# Patient Record
Sex: FEMALE | Race: WHITE | Employment: OTHER | URBAN - NONMETROPOLITAN AREA
[De-identification: names, ages, dates, MRNs, and addresses within clinical notes are randomized per-mention and may not be internally consistent; named-entity substitution may affect disease eponyms.]

---

## 2017-04-04 ENCOUNTER — TELEPHONE (OUTPATIENT)
Dept: OBGYN | Age: 62
End: 2017-04-04

## 2017-04-04 DIAGNOSIS — Z12.31 ENCOUNTER FOR SCREENING MAMMOGRAM FOR BREAST CANCER: Primary | ICD-10-CM

## 2017-04-10 ENCOUNTER — HOSPITAL ENCOUNTER (OUTPATIENT)
Dept: WOMENS IMAGING | Age: 62
Discharge: HOME | End: 2017-04-10
Payer: OTHER GOVERNMENT

## 2017-04-10 DIAGNOSIS — Z12.31 ENCOUNTER FOR SCREENING MAMMOGRAM FOR BREAST CANCER: ICD-10-CM

## 2017-04-10 PROCEDURE — 77063 BREAST TOMOSYNTHESIS BI: CPT

## 2017-04-11 ENCOUNTER — TELEPHONE (OUTPATIENT)
Dept: WOMENS IMAGING | Age: 62
End: 2017-04-11

## 2017-04-13 ENCOUNTER — HOSPITAL ENCOUNTER (OUTPATIENT)
Dept: WOMENS IMAGING | Age: 62
Discharge: HOME | End: 2017-04-13
Payer: OTHER GOVERNMENT

## 2017-04-13 DIAGNOSIS — N64.89 BREAST ASYMMETRY: ICD-10-CM

## 2017-04-13 PROCEDURE — G0279 TOMOSYNTHESIS, MAMMO: HCPCS

## 2017-05-17 ENCOUNTER — OFFICE VISIT (OUTPATIENT)
Dept: OBGYN | Age: 62
End: 2017-05-17
Payer: OTHER GOVERNMENT

## 2017-05-17 VITALS
SYSTOLIC BLOOD PRESSURE: 140 MMHG | WEIGHT: 188 LBS | HEIGHT: 70 IN | DIASTOLIC BLOOD PRESSURE: 81 MMHG | HEART RATE: 60 BPM | BODY MASS INDEX: 26.92 KG/M2

## 2017-05-17 DIAGNOSIS — Z01.419 WELL WOMAN EXAM WITH ROUTINE GYNECOLOGICAL EXAM: Primary | ICD-10-CM

## 2017-05-17 DIAGNOSIS — N95.9 MENOPAUSAL AND POSTMENOPAUSAL DISORDER: ICD-10-CM

## 2017-05-17 DIAGNOSIS — Z12.72 SCREENING FOR VAGINAL CANCER: ICD-10-CM

## 2017-05-17 DIAGNOSIS — Z85.42 HISTORY OF ENDOMETRIAL CANCER: ICD-10-CM

## 2017-05-17 DIAGNOSIS — Z13.820 SCREENING FOR OSTEOPOROSIS: ICD-10-CM

## 2017-05-17 PROCEDURE — 99396 PREV VISIT EST AGE 40-64: CPT | Performed by: NURSE PRACTITIONER

## 2017-05-17 ASSESSMENT — ENCOUNTER SYMPTOMS
GASTROINTESTINAL NEGATIVE: 1
RESPIRATORY NEGATIVE: 1
EYES NEGATIVE: 1

## 2017-05-25 ENCOUNTER — HOSPITAL ENCOUNTER (OUTPATIENT)
Dept: WOMENS IMAGING | Age: 62
Discharge: HOME | End: 2017-05-25
Payer: OTHER GOVERNMENT

## 2017-05-25 DIAGNOSIS — N95.9 MENOPAUSAL AND POSTMENOPAUSAL DISORDER: ICD-10-CM

## 2017-05-25 DIAGNOSIS — Z13.820 SCREENING FOR OSTEOPOROSIS: ICD-10-CM

## 2017-05-25 PROCEDURE — 77080 DXA BONE DENSITY AXIAL: CPT

## 2017-10-12 ENCOUNTER — OFFICE VISIT (OUTPATIENT)
Dept: RETAIL CLINIC | Facility: CLINIC | Age: 62
End: 2017-10-12

## 2017-10-12 VITALS
SYSTOLIC BLOOD PRESSURE: 160 MMHG | DIASTOLIC BLOOD PRESSURE: 95 MMHG | WEIGHT: 189.8 LBS | RESPIRATION RATE: 20 BRPM | HEART RATE: 76 BPM | TEMPERATURE: 98.9 F

## 2017-10-12 DIAGNOSIS — Z23 NEEDS FLU SHOT: Primary | ICD-10-CM

## 2017-10-12 DIAGNOSIS — J40 BRONCHITIS: Primary | ICD-10-CM

## 2017-10-12 PROCEDURE — 99213 OFFICE O/P EST LOW 20 MIN: CPT | Performed by: NURSE PRACTITIONER

## 2017-10-12 RX ORDER — AZITHROMYCIN 250 MG/1
TABLET, FILM COATED ORAL
Qty: 6 TABLET | Refills: 0 | Status: SHIPPED | OUTPATIENT
Start: 2017-10-12 | End: 2019-04-09

## 2017-10-12 NOTE — PROGRESS NOTES
Patient was here for cough and wanted flu shot; however, she deferred this when her insurance did not go thru on [employer].  Flu shot was not given.

## 2017-10-12 NOTE — PATIENT INSTRUCTIONS
Acute Bronchitis  Bronchitis is inflammation of the airways that extend from the windpipe into the lungs (bronchi). The inflammation often causes mucus to develop. This leads to a cough, which is the most common symptom of bronchitis.   In acute bronchitis, the condition usually develops suddenly and goes away over time, usually in a couple weeks. Smoking, allergies, and asthma can make bronchitis worse. Repeated episodes of bronchitis may cause further lung problems.   CAUSES  Acute bronchitis is most often caused by the same virus that causes a cold. The virus can spread from person to person (contagious) through coughing, sneezing, and touching contaminated objects.  SIGNS AND SYMPTOMS   · Cough.    · Fever.    · Coughing up mucus.    · Body aches.    · Chest congestion.    · Chills.    · Shortness of breath.    · Sore throat.    DIAGNOSIS   Acute bronchitis is usually diagnosed through a physical exam. Your health care provider will also ask you questions about your medical history. Tests, such as chest X-rays, are sometimes done to rule out other conditions.   TREATMENT   Acute bronchitis usually goes away in a couple weeks. Oftentimes, no medical treatment is necessary. Medicines are sometimes given for relief of fever or cough. Antibiotic medicines are usually not needed but may be prescribed in certain situations. In some cases, an inhaler may be recommended to help reduce shortness of breath and control the cough. A cool mist vaporizer may also be used to help thin bronchial secretions and make it easier to clear the chest.   HOME CARE INSTRUCTIONS  · Get plenty of rest.    · Drink enough fluids to keep your urine clear or pale yellow (unless you have a medical condition that requires fluid restriction). Increasing fluids may help thin your respiratory secretions (sputum) and reduce chest congestion, and it will prevent dehydration.    · Take medicines only as directed by your health care provider.  · If  you were prescribed an antibiotic medicine, finish it all even if you start to feel better.  · Avoid smoking and secondhand smoke. Exposure to cigarette smoke or irritating chemicals will make bronchitis worse. If you are a smoker, consider using nicotine gum or skin patches to help control withdrawal symptoms. Quitting smoking will help your lungs heal faster.    · Reduce the chances of another bout of acute bronchitis by washing your hands frequently, avoiding people with cold symptoms, and trying not to touch your hands to your mouth, nose, or eyes.    · Keep all follow-up visits as directed by your health care provider.    SEEK MEDICAL CARE IF:  Your symptoms do not improve after 1 week of treatment.   SEEK IMMEDIATE MEDICAL CARE IF:  · You develop an increased fever or chills.    · You have chest pain.    · You have severe shortness of breath.  · You have bloody sputum.    · You develop dehydration.  · You faint or repeatedly feel like you are going to pass out.  · You develop repeated vomiting.  · You develop a severe headache.  MAKE SURE YOU:   · Understand these instructions.  · Will watch your condition.  · Will get help right away if you are not doing well or get worse.     This information is not intended to replace advice given to you by your health care provider. Make sure you discuss any questions you have with your health care provider.     Document Released: 01/25/2006 Document Revised: 01/08/2016 Document Reviewed: 06/10/2014  [employer] Interactive Patient Education ©2017 [employer]

## 2017-10-12 NOTE — PROGRESS NOTES
Subjective   Digna Russell is a 61 y.o. female here for cough.     Cough   This is a new problem. The current episode started 1 to 4 weeks ago (1 month ago). The problem has been unchanged (has a dry, hacky cough that won't go away). The problem occurs every few minutes. The cough is non-productive. Pertinent negatives include no chest pain, chills, ear congestion, ear pain, eye redness, fever, headaches, hemoptysis, myalgias, nasal congestion, postnasal drip, rhinorrhea, sore throat, shortness of breath, weight loss or wheezing. Nothing aggravates the symptoms. She has tried nothing for the symptoms. There is no history of asthma, bronchitis, COPD, emphysema, environmental allergies or pneumonia. has never had allergies that have caused cough or asthma-like symptoms       The following portions of the patient's history were reviewed and updated as appropriate: allergies, current medications, past family history, past medical history, past social history, past surgical history and problem list.    Review of Systems   Constitutional: Negative for chills, fever and weight loss.   HENT: Negative for ear pain, postnasal drip, rhinorrhea and sore throat.    Eyes: Negative for discharge and redness.   Respiratory: Positive for cough. Negative for hemoptysis, shortness of breath and wheezing.    Cardiovascular: Negative for chest pain.   Gastrointestinal: Negative for diarrhea, nausea and vomiting.   Genitourinary: Negative for dysuria.   Musculoskeletal: Negative for arthralgias and myalgias.   Allergic/Immunologic: Negative for environmental allergies, food allergies and immunocompromised state.   Neurological: Negative for headaches.   Hematological: Negative for adenopathy.       Objective   Physical Exam   Constitutional: She is oriented to person, place, and time. She appears well-developed and well-nourished. No distress.   HENT:   Head: Normocephalic and atraumatic.   Right Ear: External ear normal.   Left Ear:  External ear normal.   Nose: Nose normal.   Mouth/Throat: Oropharynx is clear and moist. No oropharyngeal exudate.   Eyes: Conjunctivae and EOM are normal. Pupils are equal, round, and reactive to light. Right eye exhibits no discharge. Left eye exhibits no discharge. No scleral icterus.   Neck: Normal range of motion. Neck supple. No JVD present. No tracheal deviation present. No thyromegaly present.   No mass or asymmetry appreciated   Cardiovascular: Normal rate, regular rhythm and normal heart sounds.  Exam reveals no gallop and no friction rub.    No murmur heard.  Pulmonary/Chest: Effort normal. No stridor. No respiratory distress. She has wheezes (occasional wheeze heard bilaterally with deep breathing).   Abdominal: Soft. She exhibits no distension.   Musculoskeletal: Normal range of motion. She exhibits no edema, tenderness or deformity.   Lymphadenopathy:     She has no cervical adenopathy.   Neurological: She is alert and oriented to person, place, and time. She exhibits normal muscle tone. Coordination normal.   Skin: Skin is warm and dry. No rash noted. She is not diaphoretic. No erythema. No pallor.   Psychiatric: She has a normal mood and affect. Her behavior is normal. Judgment and thought content normal.   Nursing note and vitals reviewed.      Assessment/Plan   Digna was seen today for cough.    Diagnoses and all orders for this visit:    Bronchitis    Other orders  -     azithromycin (ZITHROMAX Z-TYRONE) 250 MG tablet; Take 2 tablets the first day, then 1 tablet daily for 4 days.       Want her to follow up with her PCP ([facility]) regarding cough if not improved, and also for BP follow up.  She says her BP at home this morning was 120/72.  She has not been for yearly check-up this year, and I encouraged her to get this scheduled.

## 2018-06-04 ENCOUNTER — HOSPITAL ENCOUNTER (OUTPATIENT)
Dept: WOMENS IMAGING | Age: 63
Discharge: HOME | End: 2018-06-04
Payer: OTHER GOVERNMENT

## 2018-06-04 DIAGNOSIS — Z12.31 VISIT FOR SCREENING MAMMOGRAM: ICD-10-CM

## 2018-06-04 PROCEDURE — 77063 BREAST TOMOSYNTHESIS BI: CPT

## 2018-06-05 ENCOUNTER — TELEPHONE (OUTPATIENT)
Dept: WOMENS IMAGING | Age: 63
End: 2018-06-05

## 2018-06-07 ENCOUNTER — HOSPITAL ENCOUNTER (OUTPATIENT)
Dept: WOMENS IMAGING | Age: 63
Discharge: HOME | End: 2018-06-07
Payer: OTHER GOVERNMENT

## 2018-06-07 ENCOUNTER — HOSPITAL ENCOUNTER (OUTPATIENT)
Dept: ULTRASOUND IMAGING | Age: 63
End: 2018-06-07
Payer: OTHER GOVERNMENT

## 2018-06-07 DIAGNOSIS — N64.89 BREAST ASYMMETRY: ICD-10-CM

## 2018-06-07 PROCEDURE — 77065 DX MAMMO INCL CAD UNI: CPT

## 2018-06-12 ENCOUNTER — TELEPHONE (OUTPATIENT)
Dept: OBGYN | Age: 63
End: 2018-06-12

## 2019-04-09 ENCOUNTER — OFFICE VISIT (OUTPATIENT)
Dept: RETAIL CLINIC | Facility: CLINIC | Age: 64
End: 2019-04-09

## 2019-04-09 DIAGNOSIS — M25.519 SHOULDER PAIN, UNSPECIFIED CHRONICITY, UNSPECIFIED LATERALITY: Primary | ICD-10-CM

## 2019-04-09 RX ORDER — TIMOLOL MALEATE 5 MG/ML
SOLUTION/ DROPS OPHTHALMIC
Refills: 5 | COMMUNITY
Start: 2019-03-26 | End: 2021-07-28

## 2019-04-09 NOTE — PROGRESS NOTES
Patient presents requesting steroid injection for shoulder pain.  After we discuss the potential adverse effects of steroids on glaucoma (as patient receives injections to her eyes every 6 weeks), patient changes her mind about being seen.    YON De Dios

## 2019-08-05 ENCOUNTER — HOSPITAL ENCOUNTER (OUTPATIENT)
Dept: WOMENS IMAGING | Age: 64
Discharge: HOME | End: 2019-08-05
Payer: OTHER GOVERNMENT

## 2019-08-05 DIAGNOSIS — Z12.39 BREAST CANCER SCREENING: ICD-10-CM

## 2019-08-05 PROCEDURE — 77063 BREAST TOMOSYNTHESIS BI: CPT

## 2019-08-28 ENCOUNTER — TRANSCRIBE ORDERS (OUTPATIENT)
Dept: ADMINISTRATIVE | Facility: HOSPITAL | Age: 64
End: 2019-08-28

## 2019-08-28 ENCOUNTER — HOSPITAL ENCOUNTER (OUTPATIENT)
Dept: ULTRASOUND IMAGING | Facility: HOSPITAL | Age: 64
Discharge: HOME | End: 2019-08-28
Admitting: INTERNAL MEDICINE

## 2019-08-28 DIAGNOSIS — E83.52 HYPERCALCEMIA: Primary | ICD-10-CM

## 2019-08-28 DIAGNOSIS — E83.52 HYPERCALCEMIA: ICD-10-CM

## 2019-08-28 PROCEDURE — 76536 US EXAM OF HEAD AND NECK: CPT

## 2019-10-24 ENCOUNTER — TELEPHONE (OUTPATIENT)
Dept: OBGYN | Age: 64
End: 2019-10-24

## 2019-10-24 DIAGNOSIS — Z13.820 ENCOUNTER FOR SCREENING FOR OSTEOPOROSIS: Primary | ICD-10-CM

## 2019-10-28 ENCOUNTER — HOSPITAL ENCOUNTER (OUTPATIENT)
Dept: WOMENS IMAGING | Age: 64
Discharge: HOME | End: 2019-10-28
Payer: OTHER GOVERNMENT

## 2019-10-28 DIAGNOSIS — Z13.820 ENCOUNTER FOR SCREENING FOR OSTEOPOROSIS: ICD-10-CM

## 2019-10-28 PROCEDURE — 77080 DXA BONE DENSITY AXIAL: CPT

## 2021-07-28 ENCOUNTER — OFFICE VISIT (OUTPATIENT)
Dept: INTERNAL MEDICINE | Facility: CLINIC | Age: 66
End: 2021-07-28

## 2021-07-28 VITALS
WEIGHT: 192 LBS | BODY MASS INDEX: 27.49 KG/M2 | HEART RATE: 58 BPM | HEIGHT: 70 IN | SYSTOLIC BLOOD PRESSURE: 142 MMHG | OXYGEN SATURATION: 92 % | TEMPERATURE: 97.1 F | DIASTOLIC BLOOD PRESSURE: 82 MMHG

## 2021-07-28 DIAGNOSIS — F51.01 PRIMARY INSOMNIA: Primary | ICD-10-CM

## 2021-07-28 PROBLEM — G47.00 INSOMNIA: Status: ACTIVE | Noted: 2021-07-28

## 2021-07-28 PROBLEM — H91.90 DEAFNESS: Status: ACTIVE | Noted: 2021-07-28

## 2021-07-28 PROBLEM — M41.9 SCOLIOSIS: Status: ACTIVE | Noted: 2021-07-28

## 2021-07-28 PROCEDURE — 99213 OFFICE O/P EST LOW 20 MIN: CPT | Performed by: NURSE PRACTITIONER

## 2021-07-28 RX ORDER — BIMATOPROST 0.01 %
1 DROPS OPHTHALMIC (EYE) NIGHTLY
COMMUNITY
Start: 2021-06-24

## 2021-07-28 RX ORDER — HYDROXYZINE HYDROCHLORIDE 25 MG/1
25 TABLET, FILM COATED ORAL NIGHTLY PRN
Qty: 30 TABLET | Refills: 1 | Status: SHIPPED | OUTPATIENT
Start: 2021-07-28 | End: 2021-08-24

## 2021-07-28 RX ORDER — DORZOLAMIDE HYDROCHLORIDE AND TIMOLOL MALEATE 20; 5 MG/ML; MG/ML
1 SOLUTION/ DROPS OPHTHALMIC 2 TIMES DAILY
COMMUNITY
Start: 2021-06-09

## 2021-07-28 NOTE — PROGRESS NOTES
"Subjective   Digna Russell is a 65 y.o. female.   Chief Complaint   Patient presents with   • Insomnia     not sleeping well, has taken Ambien previously.       Digna presents today for complaints of insomnia.  She has not been evaluated in this office since September 2019 at which point she states she was given 5-month supply of Ambien and a follow-up appointment in 6 months.  She did not keep her follow-up in March 2020 related to the COVID-19 pandemic.  She has since been vaccinated and states she feels safe to make an appointment now.  She reports she will typically go to bed at 10 PM but then wakes up at 2:30 AM and again at 330.  She states by 4 AM she is usually up drinking decaf coffee.  She reports only drinking decaf coffee or decaf tea.  She does admit to having one soda per day but usually drinks this before 8 AM.  She denies any specific reason for waking up such as pain or anxiety but states she is \"just awake\".  She states she tries to stay active through the day walking through the mall.  She takes Tylenol PM which does give her some benefit through the night.  She has also tried melatonin that was not successful for her.       The following portions of the patient's history were reviewed and updated as appropriate: allergies, current medications, past family history, past medical history, past social history, past surgical history and problem list.    Review of Systems   Constitutional: Negative for activity change, appetite change, fatigue, fever, unexpected weight gain and unexpected weight loss.   HENT: Negative for swollen glands, trouble swallowing and voice change.    Eyes: Negative for blurred vision and visual disturbance.   Respiratory: Negative for cough and shortness of breath.    Cardiovascular: Negative for chest pain, palpitations and leg swelling.   Gastrointestinal: Negative for abdominal pain, constipation, diarrhea, nausea, vomiting and indigestion.   Endocrine: Negative for cold " intolerance, heat intolerance, polydipsia and polyphagia.   Genitourinary: Negative for dysuria and frequency.   Musculoskeletal: Negative for arthralgias, back pain, joint swelling and neck pain.   Skin: Negative for color change, rash and skin lesions.   Neurological: Negative for dizziness, weakness, headache, memory problem and confusion.   Hematological: Does not bruise/bleed easily.   Psychiatric/Behavioral: Positive for sleep disturbance. Negative for agitation, hallucinations and suicidal ideas. The patient is not nervous/anxious.        Objective   Past Medical History:   Diagnosis Date   • Cancer (CMS/HCC)     uterine   • Hypertension       Past Surgical History:   Procedure Laterality Date   • HYSTERECTOMY          Current Outpatient Medications:   •  dorzolamide-timolol (COSOPT) 22.3-6.8 MG/ML ophthalmic solution, Administer 1 drop to both eyes Daily., Disp: , Rfl:   •  Lumigan 0.01 % ophthalmic drops, Administer 1 drop to both eyes Every Night., Disp: , Rfl:   •  hydrOXYzine (ATARAX) 25 MG tablet, Take 1 tablet by mouth At Night As Needed (Sleep)., Disp: 30 tablet, Rfl: 1     Vitals:    07/28/21 0847   BP: 142/82   Pulse: 58   Temp: 97.1 °F (36.2 °C)   SpO2: 92%         07/28/21 0847   Weight: 87.1 kg (192 lb)         Physical Exam  Constitutional:       General: She is not in acute distress.     Appearance: She is well-developed.   HENT:      Head: Normocephalic.      Right Ear: External ear normal.      Left Ear: External ear normal.      Mouth/Throat:      Mouth: Mucous membranes are moist. No oral lesions.      Pharynx: Oropharynx is clear.   Eyes:      Conjunctiva/sclera: Conjunctivae normal.   Cardiovascular:      Rate and Rhythm: Normal rate and regular rhythm.      Heart sounds: Normal heart sounds.   Pulmonary:      Effort: Pulmonary effort is normal.      Breath sounds: Normal breath sounds.   Skin:     General: Skin is warm and dry.   Neurological:      Mental Status: She is alert and  oriented to person, place, and time.      Gait: Gait normal.   Psychiatric:         Mood and Affect: Mood normal.         Speech: Speech normal.         Behavior: Behavior normal.         Thought Content: Thought content normal.               Assessment/Plan   Diagnoses and all orders for this visit:    1. Primary insomnia (Primary)  -     hydrOXYzine (ATARAX) 25 MG tablet; Take 1 tablet by mouth At Night As Needed (Sleep).  Dispense: 30 tablet; Refill: 1      Digna has Never tried hydroxyzine to help her sleep.  If she has had some benefit with Tylenol PM I think we could try her on some hydroxyzine and try to avoid a controlled prescription of Ambien at this time.  I advised that she can start with 1 tablet at bedtime without any additional sleep aids.  If she does not see benefit with the 25 mg tablet she can try 2 tablets at bedtime.  We will keep a short follow-up in about 4 weeks for yearly exam and blood work.  We will further evaluate her blood pressure and blood sugars.  I do see a history of hypertension in her chart but she denies being on any kind of blood pressure medication or ever being on blood pressure medication.

## 2021-08-02 ENCOUNTER — LAB (OUTPATIENT)
Dept: INTERNAL MEDICINE | Facility: CLINIC | Age: 66
End: 2021-08-02

## 2021-08-02 DIAGNOSIS — Z11.59 NEED FOR HEPATITIS C SCREENING TEST: ICD-10-CM

## 2021-08-02 DIAGNOSIS — Z00.00 ANNUAL PHYSICAL EXAM: Primary | ICD-10-CM

## 2021-08-03 LAB
ALBUMIN SERPL-MCNC: 4.5 G/DL (ref 3.5–5.2)
ALBUMIN/GLOB SERPL: 1.7 G/DL
ALP SERPL-CCNC: 125 U/L (ref 39–117)
ALT SERPL-CCNC: 35 U/L (ref 1–33)
AST SERPL-CCNC: 23 U/L (ref 1–32)
BASOPHILS # BLD AUTO: 0.04 10*3/MM3 (ref 0–0.2)
BASOPHILS NFR BLD AUTO: 0.4 % (ref 0–1.5)
BILIRUB SERPL-MCNC: 0.4 MG/DL (ref 0–1.2)
BUN SERPL-MCNC: 13 MG/DL (ref 8–23)
BUN/CREAT SERPL: 13 (ref 7–25)
CALCIUM SERPL-MCNC: 10.7 MG/DL (ref 8.6–10.5)
CHLORIDE SERPL-SCNC: 101 MMOL/L (ref 98–107)
CHOLEST SERPL-MCNC: 215 MG/DL (ref 0–200)
CO2 SERPL-SCNC: 24.8 MMOL/L (ref 22–29)
CREAT SERPL-MCNC: 1 MG/DL (ref 0.57–1)
EOSINOPHIL # BLD AUTO: 0.12 10*3/MM3 (ref 0–0.4)
EOSINOPHIL NFR BLD AUTO: 1.3 % (ref 0.3–6.2)
ERYTHROCYTE [DISTWIDTH] IN BLOOD BY AUTOMATED COUNT: 12.7 % (ref 12.3–15.4)
GLOBULIN SER CALC-MCNC: 2.6 GM/DL
GLUCOSE SERPL-MCNC: 107 MG/DL (ref 65–99)
HCT VFR BLD AUTO: 42.9 % (ref 34–46.6)
HCV AB S/CO SERPL IA: <0.1 S/CO RATIO (ref 0–0.9)
HDLC SERPL-MCNC: 52 MG/DL (ref 40–60)
HGB BLD-MCNC: 13.6 G/DL (ref 12–15.9)
IMM GRANULOCYTES # BLD AUTO: 0.02 10*3/MM3 (ref 0–0.05)
IMM GRANULOCYTES NFR BLD AUTO: 0.2 % (ref 0–0.5)
LDLC SERPL CALC-MCNC: 142 MG/DL (ref 0–100)
LYMPHOCYTES # BLD AUTO: 3.36 10*3/MM3 (ref 0.7–3.1)
LYMPHOCYTES NFR BLD AUTO: 36.1 % (ref 19.6–45.3)
MCH RBC QN AUTO: 29.6 PG (ref 26.6–33)
MCHC RBC AUTO-ENTMCNC: 31.7 G/DL (ref 31.5–35.7)
MCV RBC AUTO: 93.3 FL (ref 79–97)
MONOCYTES # BLD AUTO: 0.49 10*3/MM3 (ref 0.1–0.9)
MONOCYTES NFR BLD AUTO: 5.3 % (ref 5–12)
NEUTROPHILS # BLD AUTO: 5.27 10*3/MM3 (ref 1.7–7)
NEUTROPHILS NFR BLD AUTO: 56.7 % (ref 42.7–76)
NRBC BLD AUTO-RTO: 0 /100 WBC (ref 0–0.2)
PLATELET # BLD AUTO: 257 10*3/MM3 (ref 140–450)
POTASSIUM SERPL-SCNC: 4.7 MMOL/L (ref 3.5–5.2)
PROT SERPL-MCNC: 7.1 G/DL (ref 6–8.5)
RBC # BLD AUTO: 4.6 10*6/MM3 (ref 3.77–5.28)
SODIUM SERPL-SCNC: 136 MMOL/L (ref 136–145)
TRIGL SERPL-MCNC: 119 MG/DL (ref 0–150)
TSH SERPL DL<=0.005 MIU/L-ACNC: 1.99 UIU/ML (ref 0.27–4.2)
URATE SERPL-MCNC: 4.8 MG/DL (ref 2.4–5.7)
VLDLC SERPL CALC-MCNC: 21 MG/DL (ref 5–40)
WBC # BLD AUTO: 9.3 10*3/MM3 (ref 3.4–10.8)

## 2021-08-10 LAB
APPEARANCE UR: CLEAR
BACTERIA #/AREA URNS HPF: NORMAL /[HPF]
BILIRUB UR QL STRIP: NEGATIVE
CASTS URNS QL MICRO: NORMAL /LPF
COLOR UR: YELLOW
EPI CELLS #/AREA URNS HPF: NORMAL /HPF (ref 0–10)
GLUCOSE UR QL: NEGATIVE
HGB UR QL STRIP: NEGATIVE
KETONES UR QL STRIP: NEGATIVE
LEUKOCYTE ESTERASE UR QL STRIP: ABNORMAL
MICRO URNS: ABNORMAL
NITRITE UR QL STRIP: NEGATIVE
PH UR STRIP: 6 [PH] (ref 5–7.5)
PROT UR QL STRIP: NEGATIVE
RBC #/AREA URNS HPF: NORMAL /HPF (ref 0–2)
SP GR UR: 1.01 (ref 1–1.03)
UROBILINOGEN UR STRIP-MCNC: 0.2 MG/DL (ref 0.2–1)
WBC #/AREA URNS HPF: NORMAL /HPF (ref 0–5)

## 2021-08-24 ENCOUNTER — OFFICE VISIT (OUTPATIENT)
Dept: INTERNAL MEDICINE | Facility: CLINIC | Age: 66
End: 2021-08-24

## 2021-08-24 VITALS
TEMPERATURE: 97.3 F | SYSTOLIC BLOOD PRESSURE: 118 MMHG | HEART RATE: 53 BPM | OXYGEN SATURATION: 97 % | DIASTOLIC BLOOD PRESSURE: 76 MMHG | WEIGHT: 192 LBS | BODY MASS INDEX: 27.49 KG/M2 | HEIGHT: 70 IN

## 2021-08-24 DIAGNOSIS — F51.01 PRIMARY INSOMNIA: ICD-10-CM

## 2021-08-24 DIAGNOSIS — R73.01 IFG (IMPAIRED FASTING GLUCOSE): ICD-10-CM

## 2021-08-24 DIAGNOSIS — F41.9 ANXIETY: ICD-10-CM

## 2021-08-24 DIAGNOSIS — Z00.00 ANNUAL PHYSICAL EXAM: Primary | ICD-10-CM

## 2021-08-24 LAB — HBA1C MFR BLD: 5.7 %

## 2021-08-24 PROCEDURE — 83036 HEMOGLOBIN GLYCOSYLATED A1C: CPT | Performed by: NURSE PRACTITIONER

## 2021-08-24 PROCEDURE — 99396 PREV VISIT EST AGE 40-64: CPT | Performed by: NURSE PRACTITIONER

## 2021-08-24 RX ORDER — PHENOL 1.4 %
2 AEROSOL, SPRAY (ML) MUCOUS MEMBRANE
COMMUNITY
End: 2022-01-01

## 2021-08-24 RX ORDER — ACETAMINOPHEN,DIPHENHYDRAMINE HCL 500; 25 MG/1; MG/1
1 TABLET, FILM COATED ORAL NIGHTLY PRN
COMMUNITY
End: 2022-01-01

## 2021-08-24 NOTE — PROGRESS NOTES
Subjective   Digna Russell is a 65 y.o. female.   Chief Complaint   Patient presents with   • Annual Exam       Digna presents today for annual physical exam.  She complains of continued insomnia.  He was trailed on Hydroxyzine which she reports was ineffective.  She tried up to 50mg at bedtime until she ran out of her medicine.  She states she is still up at 2-3am.  She continues on 20mg Melatonin and Tylenol PM.  After waking, she drinks 2-3 cups of decaf coffee.  She usually will drink 1 soda in the morning and then decaf tea throughout the day.  She has anxiety with the COVID-19 pandemic and just getting comfortable making appointments.  She does not want to get a mammogram or Dexa scan as she does not want to get COVID-19.    Yearly lab work was obtained showing elevated cholesterol.  She reports eating mar, sausage, beef, chicken, and fish for animal protein and will deep chow french fries at home.  She eats primarily potatoes as her vegetable.  She does consume snack sized candy bars at night.    She is overdue for her pap smear.  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  She has had 1 pap smear since her hysterectomy but states her GYN provider is no longer available, but there is a nurse practitioner there.    She does perform self breast exams while in the shower.         The following portions of the patient's history were reviewed and updated as appropriate: allergies, current medications, past family history, past medical history, past social history, past surgical history and problem list.    Review of Systems   Constitutional: Negative for activity change, appetite change, fatigue, fever, unexpected weight gain and unexpected weight loss.   HENT: Negative for swollen glands, trouble swallowing and voice change.    Eyes: Negative for blurred vision and visual disturbance.   Respiratory: Negative for cough and shortness of breath.    Cardiovascular: Negative for chest  pain, palpitations and leg swelling.   Gastrointestinal: Negative for abdominal pain, constipation, diarrhea, nausea, vomiting and indigestion.   Endocrine: Negative for cold intolerance, heat intolerance, polydipsia and polyphagia.   Genitourinary: Negative for dysuria and frequency.   Musculoskeletal: Negative for arthralgias, back pain, joint swelling and neck pain.   Skin: Negative for color change, rash and skin lesions.   Neurological: Negative for dizziness, weakness, headache, memory problem and confusion.   Hematological: Does not bruise/bleed easily.   Psychiatric/Behavioral: Positive for sleep disturbance. Negative for agitation, hallucinations and suicidal ideas. The patient is nervous/anxious.        Objective   Past Medical History:   Diagnosis Date   • Cancer (CMS/HCC)     uterine   • Hypertension       Past Surgical History:   Procedure Laterality Date   • HYSTERECTOMY          Current Outpatient Medications:   •  diphenhydrAMINE-acetaminophen (TYLENOL PM)  MG tablet per tablet, Take 1 tablet by mouth At Night As Needed for Sleep., Disp: , Rfl:   •  dorzolamide-timolol (COSOPT) 22.3-6.8 MG/ML ophthalmic solution, Administer 1 drop to both eyes Daily., Disp: , Rfl:   •  Lumigan 0.01 % ophthalmic drops, Administer 1 drop to both eyes Every Night., Disp: , Rfl:   •  Melatonin 10 MG tablet, Take 2 tablets by mouth every night at bedtime., Disp: , Rfl:      Vitals:    08/24/21 0857   BP: 118/76   Pulse: 53   Temp: 97.3 °F (36.3 °C)   SpO2: 97%         08/24/21  0857   Weight: 87.1 kg (192 lb)         Physical Exam  Constitutional:       Appearance: She is well-developed.   HENT:      Head: Normocephalic.      Right Ear: Tympanic membrane and external ear normal.      Left Ear: Tympanic membrane and external ear normal.      Nose: Nose normal.      Mouth/Throat:      Mouth: Mucous membranes are moist. No oral lesions.      Pharynx: Oropharynx is clear.   Eyes:      Conjunctiva/sclera: Conjunctivae  normal.      Pupils: Pupils are equal, round, and reactive to light.   Neck:      Thyroid: No thyromegaly.      Vascular: No carotid bruit.   Cardiovascular:      Rate and Rhythm: Normal rate and regular rhythm.      Pulses: Normal pulses.           Radial pulses are 2+ on the right side and 2+ on the left side.      Heart sounds: Normal heart sounds. No murmur heard.     Pulmonary:      Effort: Pulmonary effort is normal.      Breath sounds: Normal breath sounds.   Chest:      Breasts: Breasts are symmetrical.         Right: Normal. No skin change or tenderness.         Left: Normal. No skin change or tenderness.   Abdominal:      General: Bowel sounds are normal.      Palpations: Abdomen is soft.      Tenderness: There is no abdominal tenderness.   Musculoskeletal:      Cervical back: Normal range of motion.      Right lower leg: No edema.      Left lower leg: No edema.   Skin:     General: Skin is warm and dry.   Neurological:      Mental Status: She is alert and oriented to person, place, and time.      Gait: Gait normal.   Psychiatric:         Mood and Affect: Mood normal.         Speech: Speech normal.         Behavior: Behavior normal.         Thought Content: Thought content normal.               Assessment/Plan   Diagnoses and all orders for this visit:    1. Annual physical exam (Primary)    2. Primary insomnia  -     780241 [id number] [facility] -    3. Anxiety    4. IFG (impaired fasting glucose)  -     POC Glycosylated Hemoglobin (Hb A1C)      In regards to her insomnia.  I have strongly discussed cutting out soda.  I have informed her as well that decaf tea and coffee still contain caffeine.  I have encouraged her to clean up her diet.  Avoid fried and processed foods along with high levels of saturated fats.  I would like to recheck her Lipid panel, AST, and ALT in 2 months.  She has taken Ambien in the past which is what she is wanting filled today.  I have explained to her that I cannot sign off on this but  will discuss with Dr. Rock when he returns on Monday.    A1C is 5.7 in office today.  I've explained again cutting out soda and sweets to keep blood sugar regulated.    I've discussed in great lengths getting her mammogram completed.  She is very nervous about COVID-19, understandably, and wants to wait.    Her colonoscopy was completed in 2019  She needs to have Pap completed.  She would like to stick with her GYN office for this.  I've requested she schedule to have this done.

## 2021-08-25 LAB
AMPHETAMINES UR QL SCN: NEGATIVE NG/ML
BARBITURATES UR QL SCN: NEGATIVE NG/ML
BENZODIAZ UR QL: NEGATIVE NG/ML
BZE UR QL: NEGATIVE NG/ML
CANNABINOIDS UR QL SCN: NEGATIVE NG/ML
OPIATES UR QL: NEGATIVE NG/ML
PCP UR QL: NEGATIVE NG/ML

## 2021-12-13 ENCOUNTER — OFFICE VISIT (OUTPATIENT)
Dept: INTERNAL MEDICINE | Facility: CLINIC | Age: 66
End: 2021-12-13

## 2021-12-13 VITALS
TEMPERATURE: 96.9 F | WEIGHT: 185 LBS | HEIGHT: 70 IN | OXYGEN SATURATION: 99 % | HEART RATE: 58 BPM | SYSTOLIC BLOOD PRESSURE: 120 MMHG | BODY MASS INDEX: 26.48 KG/M2 | DIASTOLIC BLOOD PRESSURE: 80 MMHG

## 2021-12-13 DIAGNOSIS — M54.41 CHRONIC BILATERAL LOW BACK PAIN WITH RIGHT-SIDED SCIATICA: ICD-10-CM

## 2021-12-13 DIAGNOSIS — G89.29 CHRONIC BILATERAL LOW BACK PAIN WITH RIGHT-SIDED SCIATICA: ICD-10-CM

## 2021-12-13 DIAGNOSIS — M41.9 SCOLIOSIS, UNSPECIFIED SCOLIOSIS TYPE, UNSPECIFIED SPINAL REGION: ICD-10-CM

## 2021-12-13 DIAGNOSIS — M70.61 TROCHANTERIC BURSITIS OF RIGHT HIP: Primary | ICD-10-CM

## 2021-12-13 PROCEDURE — 99213 OFFICE O/P EST LOW 20 MIN: CPT | Performed by: NURSE PRACTITIONER

## 2021-12-13 PROCEDURE — 96372 THER/PROPH/DIAG INJ SC/IM: CPT | Performed by: NURSE PRACTITIONER

## 2021-12-13 RX ORDER — NETARSUDIL 0.2 MG/ML
1 SOLUTION/ DROPS OPHTHALMIC; TOPICAL NIGHTLY
Status: ON HOLD | COMMUNITY
Start: 2021-11-19 | End: 2022-01-01

## 2021-12-13 RX ORDER — TRIAMCINOLONE ACETONIDE 40 MG/ML
60 INJECTION, SUSPENSION INTRA-ARTICULAR; INTRAMUSCULAR ONCE
Status: COMPLETED | OUTPATIENT
Start: 2021-12-13 | End: 2021-12-13

## 2021-12-13 RX ADMIN — TRIAMCINOLONE ACETONIDE 60 MG: 40 INJECTION, SUSPENSION INTRA-ARTICULAR; INTRAMUSCULAR at 12:20

## 2021-12-13 NOTE — PROGRESS NOTES
Subjective   Digna Russell is a 66 y.o. female.   Chief Complaint   Patient presents with   • Hip Pain     right hip and leg pain; pt states it has been going on about a month started as a numbness on her right inner thigh and then seemed to move up to her right hip and now runs down her leg; describes as a sharp stabbing pain       Digna presents today for complaints of right thigh/hip pain that has been ongoing for about 1 month.  She states she started to have pain in the inner/upper thigh that has gradually progressed to wrapping around to the right hip.  She denies any injury, fall, or hit to the area.  She reports having a similar issue in the past that was resolved with an injection.  She has been using heating pad for symptoms.        The following portions of the patient's history were reviewed and updated as appropriate: allergies, current medications, past family history, past medical history, past social history, past surgical history and problem list.    Review of Systems   Constitutional: Negative for activity change, appetite change, fatigue, fever, unexpected weight gain and unexpected weight loss.   HENT: Negative for swollen glands, trouble swallowing and voice change.    Eyes: Negative for blurred vision and visual disturbance.   Respiratory: Negative for cough and shortness of breath.    Cardiovascular: Negative for chest pain, palpitations and leg swelling.   Gastrointestinal: Negative for abdominal pain, constipation, diarrhea, nausea, vomiting and indigestion.   Endocrine: Negative for cold intolerance, heat intolerance, polydipsia and polyphagia.   Genitourinary: Negative for dysuria and frequency.   Musculoskeletal: Negative for arthralgias, back pain, joint swelling and neck pain.        Right thigh/hip pain     Skin: Negative for color change, rash and skin lesions.   Neurological: Negative for dizziness, weakness, headache, memory problem and confusion.   Hematological: Does not bruise/bleed  easily.   Psychiatric/Behavioral: Negative for agitation, hallucinations and suicidal ideas. The patient is not nervous/anxious.        Objective   Past Medical History:   Diagnosis Date   • Cancer (HCC)     uterine   • Hypertension       Past Surgical History:   Procedure Laterality Date   • HYSTERECTOMY          Current Outpatient Medications:   •  diphenhydrAMINE-acetaminophen (TYLENOL PM)  MG tablet per tablet, Take 1 tablet by mouth At Night As Needed for Sleep., Disp: , Rfl:   •  dorzolamide-timolol (COSOPT) 22.3-6.8 MG/ML ophthalmic solution, Administer 1 drop to both eyes Daily., Disp: , Rfl:   •  Lumigan 0.01 % ophthalmic drops, Administer 1 drop to both eyes Every Night., Disp: , Rfl:   •  Melatonin 10 MG tablet, Take 2 tablets by mouth every night at bedtime., Disp: , Rfl:   •  Rhopressa 0.02 % solution, Administer 1 drop into the left eye Every Night., Disp: , Rfl:   No current facility-administered medications for this visit.      Vitals:    12/13/21 1154   BP: 120/80   Pulse: 58   Temp: 96.9 °F (36.1 °C)   SpO2: 99%         12/13/21  1154   Weight: 83.9 kg (185 lb)       Body mass index is 26.54 kg/m².    Physical Exam  Constitutional:       General: She is not in acute distress.     Appearance: She is well-developed.   HENT:      Head: Normocephalic.      Right Ear: External ear normal.      Left Ear: External ear normal.   Eyes:      Conjunctiva/sclera: Conjunctivae normal.   Cardiovascular:      Rate and Rhythm: Normal rate and regular rhythm.      Heart sounds: Normal heart sounds.   Pulmonary:      Effort: Pulmonary effort is normal.      Breath sounds: Normal breath sounds.   Musculoskeletal:      Lumbar back: Positive right straight leg raise test.      Right hip: Tenderness (Very sensitive with palpation to right thigh and groin- no lesions or rash noted. ) and bony tenderness (To greater trochantor) present.   Skin:     General: Skin is warm and dry.   Neurological:      Mental Status:  She is alert and oriented to person, place, and time.      Gait: Gait normal.   Psychiatric:         Mood and Affect: Mood normal.         Speech: Speech normal.         Behavior: Behavior normal.         Thought Content: Thought content normal.               Assessment/Plan   Diagnoses and all orders for this visit:    1. Trochanteric bursitis of right hip (Primary)  -     triamcinolone acetonide (KENALOG-40) injection 60 mg  -     XR Hip With or Without Pelvis 2 - 3 View Right; Future    2. Scoliosis, unspecified scoliosis type, unspecified spinal region  -     XR Spine Lumbar 2 or 3 View; Future    3. Chronic bilateral low back pain with right-sided sciatica  -     XR Spine Lumbar 2 or 3 View; Future      I am not able to see any recent imaging though patient states she has had this issue in the past.  I am going to give her a Kenalog injection today and send her for x-rays to evaluate the area.  I have asked that she return to the office for further evaluation if symptoms do not improve/resolve or start to worsen.  May need to refer to ortho for further management and evaluation.

## 2021-12-14 ENCOUNTER — HOSPITAL ENCOUNTER (OUTPATIENT)
Dept: GENERAL RADIOLOGY | Facility: HOSPITAL | Age: 66
Discharge: HOME | End: 2021-12-14
Admitting: NURSE PRACTITIONER

## 2021-12-14 DIAGNOSIS — M41.9 SCOLIOSIS, UNSPECIFIED SCOLIOSIS TYPE, UNSPECIFIED SPINAL REGION: ICD-10-CM

## 2021-12-14 DIAGNOSIS — M70.61 TROCHANTERIC BURSITIS OF RIGHT HIP: ICD-10-CM

## 2021-12-14 DIAGNOSIS — M54.41 CHRONIC BILATERAL LOW BACK PAIN WITH RIGHT-SIDED SCIATICA: ICD-10-CM

## 2021-12-14 DIAGNOSIS — G89.29 CHRONIC BILATERAL LOW BACK PAIN WITH RIGHT-SIDED SCIATICA: ICD-10-CM

## 2021-12-14 PROCEDURE — 72100 X-RAY EXAM L-S SPINE 2/3 VWS: CPT

## 2021-12-14 PROCEDURE — 73502 X-RAY EXAM HIP UNI 2-3 VIEWS: CPT

## 2021-12-15 ENCOUNTER — TELEPHONE (OUTPATIENT)
Dept: INTERNAL MEDICINE | Facility: CLINIC | Age: 66
End: 2021-12-15

## 2021-12-15 NOTE — TELEPHONE ENCOUNTER
Spoke with pt gave her your recommendation, at this time pt does not want to do an ortho referral

## 2021-12-15 NOTE — TELEPHONE ENCOUNTER
Spoke with pt gave results for spine x ray; pt states that the pain is fine but still having numbness in her upper hip/thigh area

## 2021-12-15 NOTE — TELEPHONE ENCOUNTER
----- Message from YON Carpenter sent at 12/14/2021 11:51 AM CST -----  X-ray spine shows scoliosis and degenerative changes.  No acute fracture.  How are her symptoms?

## 2022-01-01 ENCOUNTER — ANESTHESIA (OUTPATIENT)
Dept: PERIOP | Facility: HOSPITAL | Age: 67
End: 2022-01-01

## 2022-01-01 ENCOUNTER — LAB (OUTPATIENT)
Dept: LAB | Facility: HOSPITAL | Age: 67
End: 2022-01-01

## 2022-01-01 ENCOUNTER — APPOINTMENT (OUTPATIENT)
Dept: CT IMAGING | Facility: HOSPITAL | Age: 67
End: 2022-01-01

## 2022-01-01 ENCOUNTER — APPOINTMENT (OUTPATIENT)
Dept: CARDIOLOGY | Facility: HOSPITAL | Age: 67
End: 2022-01-01

## 2022-01-01 ENCOUNTER — HOSPITAL ENCOUNTER (OUTPATIENT)
Dept: GENERAL RADIOLOGY | Facility: HOSPITAL | Age: 67
Discharge: HOME | End: 2022-10-28

## 2022-01-01 ENCOUNTER — TRANSCRIBE ORDERS (OUTPATIENT)
Dept: LAB | Facility: HOSPITAL | Age: 67
End: 2022-01-01

## 2022-01-01 ENCOUNTER — HOSPITAL ENCOUNTER (INPATIENT)
Facility: HOSPITAL | Age: 67
LOS: 3 days | Discharge: NURSING, REHAB OR LONG-TERM CARE | End: 2022-08-15
Attending: STUDENT IN AN ORGANIZED HEALTH CARE EDUCATION/TRAINING PROGRAM | Admitting: INTERNAL MEDICINE

## 2022-01-01 ENCOUNTER — TELEPHONE (OUTPATIENT)
Dept: INTERNAL MEDICINE | Facility: CLINIC | Age: 67
End: 2022-01-01

## 2022-01-01 ENCOUNTER — HOSPITAL ENCOUNTER (OUTPATIENT)
Facility: HOSPITAL | Age: 67
Setting detail: HOSPITAL OUTPATIENT SURGERY
Discharge: HOME | End: 2022-04-21
Attending: ORTHOPAEDIC SURGERY | Admitting: ORTHOPAEDIC SURGERY

## 2022-01-01 ENCOUNTER — ANESTHESIA EVENT (OUTPATIENT)
Dept: PERIOP | Facility: HOSPITAL | Age: 67
End: 2022-01-01

## 2022-01-01 ENCOUNTER — APPOINTMENT (OUTPATIENT)
Dept: GENERAL RADIOLOGY | Facility: HOSPITAL | Age: 67
End: 2022-01-01

## 2022-01-01 ENCOUNTER — OFFICE VISIT (OUTPATIENT)
Dept: INTERNAL MEDICINE | Facility: CLINIC | Age: 67
End: 2022-01-01

## 2022-01-01 ENCOUNTER — PRE-ADMISSION TESTING (OUTPATIENT)
Dept: PREADMISSION TESTING | Facility: HOSPITAL | Age: 67
End: 2022-01-01

## 2022-01-01 ENCOUNTER — APPOINTMENT (OUTPATIENT)
Dept: MRI IMAGING | Facility: HOSPITAL | Age: 67
End: 2022-01-01

## 2022-01-01 VITALS
RESPIRATION RATE: 18 BRPM | BODY MASS INDEX: 23.43 KG/M2 | OXYGEN SATURATION: 97 % | DIASTOLIC BLOOD PRESSURE: 79 MMHG | SYSTOLIC BLOOD PRESSURE: 179 MMHG | HEART RATE: 72 BPM | WEIGHT: 163.7 LBS | HEIGHT: 70 IN | TEMPERATURE: 97.7 F

## 2022-01-01 VITALS
HEIGHT: 70 IN | OXYGEN SATURATION: 98 % | TEMPERATURE: 97.1 F | HEART RATE: 86 BPM | BODY MASS INDEX: 20.56 KG/M2 | WEIGHT: 143.6 LBS | DIASTOLIC BLOOD PRESSURE: 90 MMHG | SYSTOLIC BLOOD PRESSURE: 122 MMHG

## 2022-01-01 VITALS
OXYGEN SATURATION: 100 % | SYSTOLIC BLOOD PRESSURE: 157 MMHG | HEART RATE: 83 BPM | DIASTOLIC BLOOD PRESSURE: 73 MMHG | WEIGHT: 179.01 LBS | RESPIRATION RATE: 18 BRPM | HEIGHT: 68 IN | BODY MASS INDEX: 27.13 KG/M2

## 2022-01-01 VITALS
OXYGEN SATURATION: 99 % | SYSTOLIC BLOOD PRESSURE: 120 MMHG | WEIGHT: 148 LBS | HEART RATE: 88 BPM | TEMPERATURE: 97.3 F | BODY MASS INDEX: 21.19 KG/M2 | DIASTOLIC BLOOD PRESSURE: 62 MMHG | HEIGHT: 70 IN

## 2022-01-01 VITALS
TEMPERATURE: 97.1 F | DIASTOLIC BLOOD PRESSURE: 88 MMHG | HEIGHT: 70 IN | OXYGEN SATURATION: 98 % | BODY MASS INDEX: 21.45 KG/M2 | SYSTOLIC BLOOD PRESSURE: 112 MMHG | WEIGHT: 149.8 LBS | HEART RATE: 91 BPM

## 2022-01-01 VITALS
HEART RATE: 77 BPM | SYSTOLIC BLOOD PRESSURE: 196 MMHG | RESPIRATION RATE: 16 BRPM | OXYGEN SATURATION: 96 % | DIASTOLIC BLOOD PRESSURE: 82 MMHG | TEMPERATURE: 97.2 F

## 2022-01-01 VITALS
BODY MASS INDEX: 22.48 KG/M2 | SYSTOLIC BLOOD PRESSURE: 140 MMHG | WEIGHT: 157 LBS | HEART RATE: 94 BPM | OXYGEN SATURATION: 99 % | HEIGHT: 70 IN | TEMPERATURE: 97.1 F | DIASTOLIC BLOOD PRESSURE: 84 MMHG

## 2022-01-01 DIAGNOSIS — E78.2 MIXED HYPERLIPIDEMIA: ICD-10-CM

## 2022-01-01 DIAGNOSIS — R29.898 WEAKNESS OF BOTH LOWER EXTREMITIES: ICD-10-CM

## 2022-01-01 DIAGNOSIS — R73.03 PREDIABETES: ICD-10-CM

## 2022-01-01 DIAGNOSIS — Z78.0 POSTMENOPAUSE: ICD-10-CM

## 2022-01-01 DIAGNOSIS — M25.551 RIGHT HIP PAIN: Primary | ICD-10-CM

## 2022-01-01 DIAGNOSIS — Z23 NEED FOR PNEUMOCOCCAL VACCINE: ICD-10-CM

## 2022-01-01 DIAGNOSIS — R19.7 DIARRHEA, UNSPECIFIED TYPE: Primary | ICD-10-CM

## 2022-01-01 DIAGNOSIS — R11.0 NAUSEA: ICD-10-CM

## 2022-01-01 DIAGNOSIS — R13.10 DYSPHAGIA, UNSPECIFIED TYPE: Primary | ICD-10-CM

## 2022-01-01 DIAGNOSIS — Z74.09 IMPAIRED MOBILITY AND ADLS: ICD-10-CM

## 2022-01-01 DIAGNOSIS — L59.0 ERYTHEMA AB IGNE: ICD-10-CM

## 2022-01-01 DIAGNOSIS — D72.825 BANDEMIA: Primary | ICD-10-CM

## 2022-01-01 DIAGNOSIS — Z11.59 SCREENING FOR VIRAL DISEASE: Primary | ICD-10-CM

## 2022-01-01 DIAGNOSIS — Z12.11 ENCOUNTER FOR SCREENING FOR MALIGNANT NEOPLASM OF COLON: ICD-10-CM

## 2022-01-01 DIAGNOSIS — M25.551 RIGHT HIP PAIN: ICD-10-CM

## 2022-01-01 DIAGNOSIS — Z00.00 ENCOUNTER FOR SUBSEQUENT ANNUAL WELLNESS VISIT (AWV) IN MEDICARE PATIENT: Primary | ICD-10-CM

## 2022-01-01 DIAGNOSIS — H91.93 BILATERAL DEAFNESS: ICD-10-CM

## 2022-01-01 DIAGNOSIS — Z72.0 TOBACCO USE: ICD-10-CM

## 2022-01-01 DIAGNOSIS — G89.29 CHRONIC PAIN OF RIGHT LOWER EXTREMITY: ICD-10-CM

## 2022-01-01 DIAGNOSIS — Z78.9 IMPAIRED MOBILITY AND ADLS: ICD-10-CM

## 2022-01-01 DIAGNOSIS — Z74.09 IMPAIRED FUNCTIONAL MOBILITY AND ACTIVITY TOLERANCE: ICD-10-CM

## 2022-01-01 DIAGNOSIS — K59.00 CONSTIPATION, UNSPECIFIED CONSTIPATION TYPE: Primary | ICD-10-CM

## 2022-01-01 DIAGNOSIS — M79.604 CHRONIC PAIN OF RIGHT LOWER EXTREMITY: ICD-10-CM

## 2022-01-01 DIAGNOSIS — E55.9 VITAMIN D DEFICIENCY: ICD-10-CM

## 2022-01-01 DIAGNOSIS — R26.9 GAIT DISTURBANCE: ICD-10-CM

## 2022-01-01 DIAGNOSIS — H91.91 DEAFNESS IN RIGHT EAR: ICD-10-CM

## 2022-01-01 DIAGNOSIS — Z91.81 AT HIGH RISK FOR FALLS: ICD-10-CM

## 2022-01-01 DIAGNOSIS — R82.90 ABNORMAL URINALYSIS: ICD-10-CM

## 2022-01-01 DIAGNOSIS — R41.89 IMPAIRED COGNITION: ICD-10-CM

## 2022-01-01 DIAGNOSIS — I16.1 HYPERTENSIVE EMERGENCY: ICD-10-CM

## 2022-01-01 DIAGNOSIS — H40.9 GLAUCOMA OF LEFT EYE, UNSPECIFIED GLAUCOMA TYPE: ICD-10-CM

## 2022-01-01 DIAGNOSIS — F51.01 PRIMARY INSOMNIA: ICD-10-CM

## 2022-01-01 DIAGNOSIS — Z12.31 ENCOUNTER FOR SCREENING MAMMOGRAM FOR MALIGNANT NEOPLASM OF BREAST: ICD-10-CM

## 2022-01-01 DIAGNOSIS — R19.7 DIARRHEA, UNSPECIFIED TYPE: ICD-10-CM

## 2022-01-01 LAB
25(OH)D3+25(OH)D2 SERPL-MCNC: 51.1 NG/ML (ref 30–100)
ABO GROUP BLD: NORMAL
ALBUMIN SERPL-MCNC: 3.8 G/DL (ref 3.5–5.2)
ALBUMIN SERPL-MCNC: 4 G/DL (ref 3.5–5.2)
ALBUMIN SERPL-MCNC: 4.1 G/DL (ref 3.5–5.2)
ALBUMIN SERPL-MCNC: 4.5 G/DL (ref 3.5–5.2)
ALBUMIN/GLOB SERPL: 1.3 G/DL
ALBUMIN/GLOB SERPL: 1.3 G/DL
ALBUMIN/GLOB SERPL: 1.6 G/DL
ALBUMIN/GLOB SERPL: 1.7 G/DL
ALP SERPL-CCNC: 108 U/L (ref 39–117)
ALP SERPL-CCNC: 117 U/L (ref 39–117)
ALP SERPL-CCNC: 128 U/L (ref 39–117)
ALP SERPL-CCNC: 151 U/L (ref 39–117)
ALT SERPL W P-5'-P-CCNC: 13 U/L (ref 1–33)
ALT SERPL W P-5'-P-CCNC: 7 U/L (ref 1–33)
ALT SERPL-CCNC: 22 U/L (ref 1–33)
ALT SERPL-CCNC: 8 U/L (ref 1–33)
AMMONIA BLD-SCNC: 20 UMOL/L (ref 11–51)
AMPHET+METHAMPHET UR QL: NEGATIVE
AMPHETAMINES UR QL: NEGATIVE
ANION GAP SERPL CALCULATED.3IONS-SCNC: 10 MMOL/L (ref 5–15)
ANION GAP SERPL CALCULATED.3IONS-SCNC: 11 MMOL/L (ref 5–15)
APAP SERPL-MCNC: 15.4 MCG/ML (ref 0–30)
APPEARANCE UR: ABNORMAL
AST SERPL-CCNC: 14 U/L (ref 1–32)
AST SERPL-CCNC: 15 U/L (ref 1–32)
AST SERPL-CCNC: 16 U/L (ref 1–32)
AST SERPL-CCNC: 17 U/L (ref 1–32)
ATMOSPHERIC PRESS: 754 MMHG
BACTERIA #/AREA URNS HPF: ABNORMAL /HPF
BACTERIA UR CULT: NORMAL
BACTERIA UR CULT: NORMAL
BARBITURATES UR QL SCN: NEGATIVE
BASE EXCESS BLDV CALC-SCNC: 0.4 MMOL/L (ref 0–2)
BASOPHILS # BLD AUTO: 0.05 10*3/MM3 (ref 0–0.2)
BASOPHILS # BLD AUTO: 0.06 10*3/MM3 (ref 0–0.2)
BASOPHILS # BLD AUTO: ABNORMAL 10*3/UL
BASOPHILS NFR BLD AUTO: 0.3 % (ref 0–1.5)
BASOPHILS NFR BLD AUTO: 0.4 % (ref 0–1.5)
BDY SITE: NORMAL
BENZODIAZ UR QL SCN: NEGATIVE
BH CV ECHO MEAS - AO MAX PG: 6.2 MMHG
BH CV ECHO MEAS - AO MEAN PG: 3 MMHG
BH CV ECHO MEAS - AO ROOT DIAM: 2.9 CM
BH CV ECHO MEAS - AO V2 MAX: 124 CM/SEC
BH CV ECHO MEAS - AO V2 VTI: 29.2 CM
BH CV ECHO MEAS - AVA(I,D): 2.38 CM2
BH CV ECHO MEAS - EDV(CUBED): 92.3 ML
BH CV ECHO MEAS - EDV(MOD-SP2): 48.9 ML
BH CV ECHO MEAS - EDV(MOD-SP4): 63.1 ML
BH CV ECHO MEAS - EF(MOD-BP): 78.6 %
BH CV ECHO MEAS - EF(MOD-SP2): 78.5 %
BH CV ECHO MEAS - EF(MOD-SP4): 78.9 %
BH CV ECHO MEAS - ESV(CUBED): 22.7 ML
BH CV ECHO MEAS - ESV(MOD-SP2): 10.5 ML
BH CV ECHO MEAS - ESV(MOD-SP4): 13.3 ML
BH CV ECHO MEAS - FS: 37.4 %
BH CV ECHO MEAS - IVS/LVPW: 1.08 CM
BH CV ECHO MEAS - IVSD: 1.01 CM
BH CV ECHO MEAS - LA DIMENSION: 3.4 CM
BH CV ECHO MEAS - LAT PEAK E' VEL: 9.8 CM/SEC
BH CV ECHO MEAS - LV DIASTOLIC VOL/BSA (35-75): 32.9 CM2
BH CV ECHO MEAS - LV MASS(C)D: 148.6 GRAMS
BH CV ECHO MEAS - LV MAX PG: 2.8 MMHG
BH CV ECHO MEAS - LV MEAN PG: 2 MMHG
BH CV ECHO MEAS - LV SYSTOLIC VOL/BSA (12-30): 6.9 CM2
BH CV ECHO MEAS - LV V1 MAX: 83.6 CM/SEC
BH CV ECHO MEAS - LV V1 VTI: 22.1 CM
BH CV ECHO MEAS - LVIDD: 4.5 CM
BH CV ECHO MEAS - LVIDS: 2.8 CM
BH CV ECHO MEAS - LVOT AREA: 3.1 CM2
BH CV ECHO MEAS - LVOT DIAM: 2 CM
BH CV ECHO MEAS - LVPWD: 0.94 CM
BH CV ECHO MEAS - MED PEAK E' VEL: 5.7 CM/SEC
BH CV ECHO MEAS - MR MAX PG: 43.3 MMHG
BH CV ECHO MEAS - MR MAX VEL: 329 CM/SEC
BH CV ECHO MEAS - MV A MAX VEL: 73.7 CM/SEC
BH CV ECHO MEAS - MV DEC SLOPE: 448 CM/SEC2
BH CV ECHO MEAS - MV E MAX VEL: 79.5 CM/SEC
BH CV ECHO MEAS - MV E/A: 1.08
BH CV ECHO MEAS - MV P1/2T: 67.3 MSEC
BH CV ECHO MEAS - MVA(P1/2T): 3.3 CM2
BH CV ECHO MEAS - PA V2 MAX: 63.6 CM/SEC
BH CV ECHO MEAS - RAP SYSTOLE: 5 MMHG
BH CV ECHO MEAS - RV MAX PG: 0.84 MMHG
BH CV ECHO MEAS - RV V1 MAX: 45.7 CM/SEC
BH CV ECHO MEAS - RV V1 VTI: 9.6 CM
BH CV ECHO MEAS - RVDD: 2.8 CM
BH CV ECHO MEAS - RVSP: 16.7 MMHG
BH CV ECHO MEAS - SI(MOD-SP2): 20 ML/M2
BH CV ECHO MEAS - SI(MOD-SP4): 26 ML/M2
BH CV ECHO MEAS - SV(LVOT): 69.4 ML
BH CV ECHO MEAS - SV(MOD-SP2): 38.4 ML
BH CV ECHO MEAS - SV(MOD-SP4): 49.8 ML
BH CV ECHO MEAS - TAPSE (>1.6): 1.67 CM
BH CV ECHO MEAS - TR MAX PG: 11.7 MMHG
BH CV ECHO MEAS - TR MAX VEL: 171 CM/SEC
BH CV ECHO MEASUREMENTS AVERAGE E/E' RATIO: 10.26
BH CV XLRA - RV BASE: 2.32 CM
BILIRUB SERPL-MCNC: 0.2 MG/DL (ref 0–1.2)
BILIRUB SERPL-MCNC: 0.2 MG/DL (ref 0–1.2)
BILIRUB SERPL-MCNC: 0.4 MG/DL (ref 0–1.2)
BILIRUB SERPL-MCNC: <0.2 MG/DL (ref 0–1.2)
BILIRUB UR QL STRIP: NEGATIVE
BILIRUB UR QL STRIP: NEGATIVE
BLD GP AB SCN SERPL QL: NEGATIVE
BODY TEMPERATURE: 37 C
BUN SERPL-MCNC: 16 MG/DL (ref 8–23)
BUN SERPL-MCNC: 17 MG/DL (ref 8–23)
BUN SERPL-MCNC: 21 MG/DL (ref 8–23)
BUN SERPL-MCNC: 26 MG/DL (ref 8–23)
BUN/CREAT SERPL: 14.2 (ref 7–25)
BUN/CREAT SERPL: 15.3 (ref 7–25)
BUN/CREAT SERPL: 15.4 (ref 7–25)
BUN/CREAT SERPL: 20.8 (ref 7–25)
BUPRENORPHINE SERPL-MCNC: NEGATIVE NG/ML
CALCIUM SERPL-MCNC: 10.5 MG/DL (ref 8.6–10.5)
CALCIUM SERPL-MCNC: 10.7 MG/DL (ref 8.6–10.5)
CALCIUM SPEC-SCNC: 10.2 MG/DL (ref 8.6–10.5)
CALCIUM SPEC-SCNC: 9.8 MG/DL (ref 8.6–10.5)
CANNABINOIDS SERPL QL: NEGATIVE
CHLORIDE SERPL-SCNC: 102 MMOL/L (ref 98–107)
CHLORIDE SERPL-SCNC: 103 MMOL/L (ref 98–107)
CHLORIDE SERPL-SCNC: 95 MMOL/L (ref 98–107)
CHLORIDE SERPL-SCNC: 99 MMOL/L (ref 98–107)
CHOLEST SERPL-MCNC: 150 MG/DL (ref 0–200)
CHOLEST SERPL-MCNC: 153 MG/DL (ref 0–200)
CHOLEST SERPL-MCNC: 154 MG/DL (ref 0–200)
CK SERPL-CCNC: 43 U/L (ref 20–180)
CLARITY UR: CLEAR
CO2 SERPL-SCNC: 20 MMOL/L (ref 22–29)
CO2 SERPL-SCNC: 22 MMOL/L (ref 22–29)
CO2 SERPL-SCNC: 24.8 MMOL/L (ref 22–29)
CO2 SERPL-SCNC: 26 MMOL/L (ref 22–29)
COCAINE UR QL: NEGATIVE
COLOR UR: ABNORMAL
COLOR UR: YELLOW
CREAT SERPL-MCNC: 1.11 MG/DL (ref 0.57–1)
CREAT SERPL-MCNC: 1.13 MG/DL (ref 0.57–1)
CREAT SERPL-MCNC: 1.25 MG/DL (ref 0.57–1)
CREAT SERPL-MCNC: 1.36 MG/DL (ref 0.57–1)
CRP SERPL-MCNC: 2.42 MG/DL (ref 0–0.5)
CRYSTALS URNS MICRO: ABNORMAL
D-LACTATE SERPL-SCNC: 1.4 MMOL/L (ref 0.5–2)
DEPRECATED RDW RBC AUTO: 45 FL (ref 37–54)
DEPRECATED RDW RBC AUTO: 46.3 FL (ref 37–54)
DEPRECATED RDW RBC AUTO: 47.2 FL (ref 37–54)
DIFFERENTIAL COMMENT: ABNORMAL
EGFRCR SERPLBLD CKD-EPI 2021: 43.3 ML/MIN/1.73
EGFRCR SERPLBLD CKD-EPI 2021: 47.6 ML/MIN/1.73
EGFRCR SERPLBLD CKD-EPI 2021: 54.1 ML/MIN/1.73
EGFRCR SERPLBLD CKD-EPI 2021: 54.9 ML/MIN/1.73
EOSINOPHIL # BLD AUTO: 0.11 10*3/MM3 (ref 0–0.4)
EOSINOPHIL # BLD AUTO: 0.18 10*3/MM3 (ref 0–0.4)
EOSINOPHIL # BLD AUTO: ABNORMAL 10*3/UL
EOSINOPHIL NFR BLD AUTO: 0.6 % (ref 0.3–6.2)
EOSINOPHIL NFR BLD AUTO: 1.1 % (ref 0.3–6.2)
EOSINOPHIL NFR BLD AUTO: ABNORMAL %
EPI CELLS #/AREA URNS HPF: ABNORMAL /HPF
ERYTHROCYTE [DISTWIDTH] IN BLOOD BY AUTOMATED COUNT: 12.9 % (ref 12.3–15.4)
ERYTHROCYTE [DISTWIDTH] IN BLOOD BY AUTOMATED COUNT: 13 % (ref 12.3–15.4)
ERYTHROCYTE [DISTWIDTH] IN BLOOD BY AUTOMATED COUNT: 13.4 % (ref 12.3–15.4)
ERYTHROCYTE [DISTWIDTH] IN BLOOD BY AUTOMATED COUNT: 13.5 % (ref 12.3–15.4)
ERYTHROCYTE [DISTWIDTH] IN BLOOD BY AUTOMATED COUNT: 14.6 % (ref 12.3–15.4)
ETHANOL UR QL: <0.01 %
FERRITIN SERPL-MCNC: 206.4 NG/ML (ref 13–150)
FOLATE SERPL-MCNC: 2.72 NG/ML (ref 4.78–24.2)
GLOBULIN SER CALC-MCNC: 2.6 GM/DL
GLOBULIN SER CALC-MCNC: 2.6 GM/DL
GLOBULIN UR ELPH-MCNC: 3 GM/DL
GLOBULIN UR ELPH-MCNC: 3 GM/DL
GLUCOSE BLDC GLUCOMTR-MCNC: 175 MG/DL (ref 70–130)
GLUCOSE SERPL-MCNC: 113 MG/DL (ref 65–99)
GLUCOSE SERPL-MCNC: 124 MG/DL (ref 65–99)
GLUCOSE SERPL-MCNC: 141 MG/DL (ref 65–99)
GLUCOSE SERPL-MCNC: 194 MG/DL (ref 65–99)
GLUCOSE UR QL STRIP: NEGATIVE
GLUCOSE UR STRIP-MCNC: NEGATIVE MG/DL
HBA1C MFR BLD: 5.3 % (ref 4.8–5.6)
HBA1C MFR BLD: 5.7 % (ref 4.8–5.6)
HCO3 BLDV-SCNC: 25.6 MMOL/L (ref 22–28)
HCT VFR BLD AUTO: 36.2 % (ref 34–46.6)
HCT VFR BLD AUTO: 37.4 % (ref 34–46.6)
HCT VFR BLD AUTO: 37.5 % (ref 34–46.6)
HCT VFR BLD AUTO: 40.9 % (ref 34–46.6)
HCT VFR BLD AUTO: 43.1 % (ref 34–46.6)
HDLC SERPL-MCNC: 33 MG/DL (ref 40–60)
HDLC SERPL-MCNC: 40 MG/DL (ref 40–60)
HDLC SERPL-MCNC: 47 MG/DL (ref 40–60)
HGB BLD-MCNC: 11 G/DL (ref 12–15.9)
HGB BLD-MCNC: 11.4 G/DL (ref 12–15.9)
HGB BLD-MCNC: 11.9 G/DL (ref 12–15.9)
HGB BLD-MCNC: 12.5 G/DL (ref 12–15.9)
HGB BLD-MCNC: 12.9 G/DL (ref 12–15.9)
HGB UR QL STRIP.AUTO: NEGATIVE
HGB UR QL STRIP: NEGATIVE
HOLD SPECIMEN: NORMAL
IMM GRANULOCYTES # BLD AUTO: 0.06 10*3/MM3 (ref 0–0.05)
IMM GRANULOCYTES # BLD AUTO: 0.12 10*3/MM3 (ref 0–0.05)
IMM GRANULOCYTES NFR BLD AUTO: 0.4 % (ref 0–0.5)
IMM GRANULOCYTES NFR BLD AUTO: 0.6 % (ref 0–0.5)
INR PPP: 1.03 (ref 0.91–1.09)
IRON 24H UR-MRATE: 53 MCG/DL (ref 37–145)
IRON SATN MFR SERPL: 16 % (ref 20–50)
KETONES UR QL STRIP: ABNORMAL
KETONES UR QL STRIP: NEGATIVE
LDLC SERPL CALC-MCNC: 73 MG/DL (ref 0–100)
LDLC SERPL CALC-MCNC: 89 MG/DL (ref 0–100)
LDLC SERPL CALC-MCNC: 90 MG/DL (ref 0–100)
LDLC/HDLC SERPL: 2.16 {RATIO}
LDLC/HDLC SERPL: 2.58 {RATIO}
LEFT ATRIUM VOLUME: 70.4 ML
LEUKOCYTE ESTERASE UR QL STRIP.AUTO: NEGATIVE
LEUKOCYTE ESTERASE UR QL STRIP: ABNORMAL
LV EF 2D ECHO EST: 75 %
LYMPHOCYTES # BLD AUTO: 2.45 10*3/MM3 (ref 0.7–3.1)
LYMPHOCYTES # BLD AUTO: 2.94 10*3/MM3 (ref 0.7–3.1)
LYMPHOCYTES # BLD AUTO: ABNORMAL 10*3/UL
LYMPHOCYTES # BLD MANUAL: 3.84 10*3/MM3 (ref 0.7–3.1)
LYMPHOCYTES NFR BLD AUTO: 13.2 % (ref 19.6–45.3)
LYMPHOCYTES NFR BLD AUTO: 17.8 % (ref 19.6–45.3)
LYMPHOCYTES NFR BLD AUTO: ABNORMAL %
LYMPHOCYTES NFR BLD MANUAL: 22 % (ref 19.6–45.3)
Lab: NORMAL
MAGNESIUM SERPL-MCNC: 2 MG/DL (ref 1.6–2.4)
MAGNESIUM SERPL-MCNC: 2.1 MG/DL (ref 1.6–2.4)
MAXIMAL PREDICTED HEART RATE: 155 BPM
MCH RBC QN AUTO: 29.1 PG (ref 26.6–33)
MCH RBC QN AUTO: 29.3 PG (ref 26.6–33)
MCH RBC QN AUTO: 29.9 PG (ref 26.6–33)
MCH RBC QN AUTO: 30.1 PG (ref 26.6–33)
MCH RBC QN AUTO: 30.8 PG (ref 26.6–33)
MCHC RBC AUTO-ENTMCNC: 29 G/DL (ref 31.5–35.7)
MCHC RBC AUTO-ENTMCNC: 30.4 G/DL (ref 31.5–35.7)
MCHC RBC AUTO-ENTMCNC: 30.5 G/DL (ref 31.5–35.7)
MCHC RBC AUTO-ENTMCNC: 31.5 G/DL (ref 31.5–35.7)
MCHC RBC AUTO-ENTMCNC: 31.7 G/DL (ref 31.5–35.7)
MCV RBC AUTO: 103.9 FL (ref 79–97)
MCV RBC AUTO: 94.9 FL (ref 79–97)
MCV RBC AUTO: 95.4 FL (ref 79–97)
MCV RBC AUTO: 96.5 FL (ref 79–97)
MCV RBC AUTO: 97.2 FL (ref 79–97)
METHADONE UR QL SCN: NEGATIVE
MODALITY: NORMAL
MONOCYTES # BLD AUTO: 0.69 10*3/MM3 (ref 0.1–0.9)
MONOCYTES # BLD AUTO: 0.95 10*3/MM3 (ref 0.1–0.9)
MONOCYTES # BLD MANUAL: 0.5 10*3/MM3 (ref 0.1–0.9)
MONOCYTES NFR BLD AUTO: 4.2 % (ref 5–12)
MONOCYTES NFR BLD AUTO: 5.1 % (ref 5–12)
MONOCYTES NFR BLD AUTO: ABNORMAL %
MONOCYTES NFR BLD MANUAL: 3.3 % (ref 5–12)
NEUTROPHILS # BLD AUTO: 12.56 10*3/MM3 (ref 1.7–7)
NEUTROPHILS # BLD MANUAL: 10.86 10*3/MM3 (ref 1.7–7)
NEUTROPHILS NFR BLD AUTO: 14.88 10*3/MM3 (ref 1.7–7)
NEUTROPHILS NFR BLD AUTO: 76.1 % (ref 42.7–76)
NEUTROPHILS NFR BLD AUTO: 80.2 % (ref 42.7–76)
NEUTROPHILS NFR BLD AUTO: ABNORMAL %
NEUTROPHILS NFR BLD MANUAL: 71.5 % (ref 42.7–76)
NITRITE UR QL STRIP: NEGATIVE
NITRITE UR QL STRIP: NEGATIVE
NRBC BLD AUTO-RTO: 0 /100 WBC (ref 0–0.2)
NRBC BLD AUTO-RTO: 0.1 /100 WBC (ref 0–0.2)
OPIATES UR QL: NEGATIVE
OXYCODONE UR QL SCN: NEGATIVE
PCO2 BLDV: 42.8 MM HG (ref 41–51)
PCP UR QL SCN: NEGATIVE
PH BLDV: 7.38 PH UNITS (ref 7.32–7.42)
PH UR STRIP.AUTO: 8 [PH] (ref 5–8)
PH UR STRIP: <=5 [PH] (ref 5–8)
PHOSPHATE SERPL-MCNC: 2.9 MG/DL (ref 2.5–4.5)
PLATELET # BLD AUTO: 360 10*3/MM3 (ref 140–450)
PLATELET # BLD AUTO: 383 10*3/MM3 (ref 140–450)
PLATELET # BLD AUTO: 447 10*3/MM3 (ref 140–450)
PLATELET # BLD AUTO: 613 10*3/MM3 (ref 140–450)
PLATELET # BLD AUTO: 653 10*3/MM3 (ref 140–450)
PLATELET BLD QL SMEAR: ABNORMAL
PMV BLD AUTO: 10 FL (ref 6–12)
PMV BLD AUTO: 10.1 FL (ref 6–12)
PMV BLD AUTO: 10.7 FL (ref 6–12)
PO2 BLDV: 34.4 MM HG (ref 27–53)
POTASSIUM SERPL-SCNC: 3.9 MMOL/L (ref 3.5–5.2)
POTASSIUM SERPL-SCNC: 4.1 MMOL/L (ref 3.5–5.2)
POTASSIUM SERPL-SCNC: 4.2 MMOL/L (ref 3.5–5.2)
POTASSIUM SERPL-SCNC: 4.5 MMOL/L (ref 3.5–5.2)
PROCALCITONIN SERPL-MCNC: 5.33 NG/ML (ref 0–0.25)
PROPOXYPH UR QL: NEGATIVE
PROT SERPL-MCNC: 6.7 G/DL (ref 6–8.5)
PROT SERPL-MCNC: 6.8 G/DL (ref 6–8.5)
PROT SERPL-MCNC: 7 G/DL (ref 6–8.5)
PROT SERPL-MCNC: 7.1 G/DL (ref 6–8.5)
PROT UR QL STRIP: ABNORMAL
PROT UR QL STRIP: ABNORMAL
PROTHROMBIN TIME: 13.1 SECONDS (ref 11.9–14.6)
QT INTERVAL: 406 MS
QT INTERVAL: 442 MS
QT INTERVAL: 558 MS
QTC INTERVAL: 419 MS
QTC INTERVAL: 438 MS
QTC INTERVAL: 443 MS
RBC # BLD AUTO: 3.75 10*6/MM3 (ref 3.77–5.28)
RBC # BLD AUTO: 3.86 10*6/MM3 (ref 3.77–5.28)
RBC # BLD AUTO: 3.92 10*6/MM3 (ref 3.77–5.28)
RBC # BLD AUTO: 4.15 10*6/MM3 (ref 3.77–5.28)
RBC # BLD AUTO: 4.31 10*6/MM3 (ref 3.77–5.28)
RBC #/AREA URNS HPF: ABNORMAL /HPF
RBC MORPH BLD: ABNORMAL
RH BLD: POSITIVE
SALICYLATES SERPL-MCNC: <0.3 MG/DL
SAO2 % BLDCOV: 67.4 % (ref 45–75)
SARS-COV-2 ORF1AB RESP QL NAA+PROBE: NOT DETECTED
SARS-COV-2 RNA PNL SPEC NAA+PROBE: NOT DETECTED
SARS-COV-2 RNA PNL SPEC NAA+PROBE: NOT DETECTED
SODIUM SERPL-SCNC: 131 MMOL/L (ref 136–145)
SODIUM SERPL-SCNC: 132 MMOL/L (ref 136–145)
SODIUM SERPL-SCNC: 136 MMOL/L (ref 136–145)
SODIUM SERPL-SCNC: 138 MMOL/L (ref 136–145)
SP GR UR STRIP: 1.01 (ref 1–1.03)
SP GR UR STRIP: ABNORMAL (ref 1–1.03)
STRESS TARGET HR: 132 BPM
T&S EXPIRATION DATE: NORMAL
TIBC SERPL-MCNC: 326 MCG/DL (ref 298–536)
TRANSFERRIN SERPL-MCNC: 219 MG/DL (ref 200–360)
TRICYCLICS UR QL SCN: NEGATIVE
TRIGL SERPL-MCNC: 118 MG/DL (ref 0–150)
TRIGL SERPL-MCNC: 180 MG/DL (ref 0–150)
TRIGL SERPL-MCNC: 199 MG/DL (ref 0–150)
TROPONIN T SERPL-MCNC: <0.01 NG/ML (ref 0–0.03)
TSH SERPL DL<=0.005 MIU/L-ACNC: 2.32 UIU/ML (ref 0.27–4.2)
TSH SERPL DL<=0.05 MIU/L-ACNC: 1.06 UIU/ML (ref 0.27–4.2)
TSH SERPL DL<=0.05 MIU/L-ACNC: 1.6 UIU/ML (ref 0.27–4.2)
URATE SERPL-MCNC: 5 MG/DL (ref 2.4–5.7)
UROBILINOGEN UR QL STRIP: ABNORMAL
UROBILINOGEN UR STRIP-MCNC: ABNORMAL MG/DL
VENTILATOR MODE: NORMAL
VIT B12 BLD-MCNC: 316 PG/ML (ref 211–946)
VLDLC SERPL CALC-MCNC: 33 MG/DL (ref 5–40)
VLDLC SERPL-MCNC: 21 MG/DL (ref 5–40)
VLDLC SERPL-MCNC: 31 MG/DL (ref 5–40)
WBC # BLD AUTO: 15.19 10*3/MM3 (ref 3.4–10.8)
WBC # BLD AUTO: 16.49 10*3/MM3 (ref 3.4–10.8)
WBC #/AREA URNS HPF: ABNORMAL /HPF
WBC NRBC COR # BLD: 11.82 10*3/MM3 (ref 3.4–10.8)
WBC NRBC COR # BLD: 13.42 10*3/MM3 (ref 3.4–10.8)
WBC NRBC COR # BLD: 18.56 10*3/MM3 (ref 3.4–10.8)
WHOLE BLOOD HOLD COAG: NORMAL
WHOLE BLOOD HOLD SPECIMEN: NORMAL

## 2022-01-01 PROCEDURE — 25010000002 ROPIVACAINE PER 1 MG: Performed by: ORTHOPAEDIC SURGERY

## 2022-01-01 PROCEDURE — 97166 OT EVAL MOD COMPLEX 45 MIN: CPT

## 2022-01-01 PROCEDURE — 82746 ASSAY OF FOLIC ACID SERUM: CPT | Performed by: PSYCHIATRY & NEUROLOGY

## 2022-01-01 PROCEDURE — 93010 ELECTROCARDIOGRAM REPORT: CPT | Performed by: INTERNAL MEDICINE

## 2022-01-01 PROCEDURE — 70450 CT HEAD/BRAIN W/O DYE: CPT

## 2022-01-01 PROCEDURE — 99285 EMERGENCY DEPT VISIT HI MDM: CPT

## 2022-01-01 PROCEDURE — 99223 1ST HOSP IP/OBS HIGH 75: CPT | Performed by: PSYCHIATRY & NEUROLOGY

## 2022-01-01 PROCEDURE — 84484 ASSAY OF TROPONIN QUANT: CPT | Performed by: INTERNAL MEDICINE

## 2022-01-01 PROCEDURE — 80306 DRUG TEST PRSMV INSTRMNT: CPT | Performed by: STUDENT IN AN ORGANIZED HEALTH CARE EDUCATION/TRAINING PROGRAM

## 2022-01-01 PROCEDURE — 36415 COLL VENOUS BLD VENIPUNCTURE: CPT

## 2022-01-01 PROCEDURE — 90677 PCV20 VACCINE IM: CPT

## 2022-01-01 PROCEDURE — G0008 ADMIN INFLUENZA VIRUS VAC: HCPCS

## 2022-01-01 PROCEDURE — 85610 PROTHROMBIN TIME: CPT | Performed by: STUDENT IN AN ORGANIZED HEALTH CARE EDUCATION/TRAINING PROGRAM

## 2022-01-01 PROCEDURE — 83735 ASSAY OF MAGNESIUM: CPT | Performed by: PSYCHIATRY & NEUROLOGY

## 2022-01-01 PROCEDURE — 87635 SARS-COV-2 COVID-19 AMP PRB: CPT | Performed by: FAMILY MEDICINE

## 2022-01-01 PROCEDURE — 84466 ASSAY OF TRANSFERRIN: CPT | Performed by: PSYCHIATRY & NEUROLOGY

## 2022-01-01 PROCEDURE — 99233 SBSQ HOSP IP/OBS HIGH 50: CPT | Performed by: PSYCHIATRY & NEUROLOGY

## 2022-01-01 PROCEDURE — 92610 EVALUATE SWALLOWING FUNCTION: CPT | Performed by: SPEECH-LANGUAGE PATHOLOGIST

## 2022-01-01 PROCEDURE — 82728 ASSAY OF FERRITIN: CPT | Performed by: PSYCHIATRY & NEUROLOGY

## 2022-01-01 PROCEDURE — 82077 ASSAY SPEC XCP UR&BREATH IA: CPT | Performed by: STUDENT IN AN ORGANIZED HEALTH CARE EDUCATION/TRAINING PROGRAM

## 2022-01-01 PROCEDURE — 86850 RBC ANTIBODY SCREEN: CPT | Performed by: STUDENT IN AN ORGANIZED HEALTH CARE EDUCATION/TRAINING PROGRAM

## 2022-01-01 PROCEDURE — 82607 VITAMIN B-12: CPT | Performed by: PSYCHIATRY & NEUROLOGY

## 2022-01-01 PROCEDURE — C9803 HOPD COVID-19 SPEC COLLECT: HCPCS | Performed by: ORTHOPAEDIC SURGERY

## 2022-01-01 PROCEDURE — 85027 COMPLETE CBC AUTOMATED: CPT | Performed by: INTERNAL MEDICINE

## 2022-01-01 PROCEDURE — 82803 BLOOD GASES ANY COMBINATION: CPT

## 2022-01-01 PROCEDURE — 84443 ASSAY THYROID STIM HORMONE: CPT | Performed by: INTERNAL MEDICINE

## 2022-01-01 PROCEDURE — 80061 LIPID PANEL: CPT | Performed by: INTERNAL MEDICINE

## 2022-01-01 PROCEDURE — 99213 OFFICE O/P EST LOW 20 MIN: CPT

## 2022-01-01 PROCEDURE — 70498 CT ANGIOGRAPHY NECK: CPT

## 2022-01-01 PROCEDURE — 70496 CT ANGIOGRAPHY HEAD: CPT

## 2022-01-01 PROCEDURE — 85025 COMPLETE CBC W/AUTO DIFF WBC: CPT | Performed by: STUDENT IN AN ORGANIZED HEALTH CARE EDUCATION/TRAINING PROGRAM

## 2022-01-01 PROCEDURE — 25010000002 ENOXAPARIN PER 10 MG: Performed by: INTERNAL MEDICINE

## 2022-01-01 PROCEDURE — 4A03X5D MEASUREMENT OF ARTERIAL FLOW, INTRACRANIAL, EXTERNAL APPROACH: ICD-10-PCS | Performed by: RADIOLOGY

## 2022-01-01 PROCEDURE — 25010000002 FENTANYL CITRATE (PF) 50 MCG/ML SOLUTION: Performed by: ANESTHESIOLOGY

## 2022-01-01 PROCEDURE — 0 IOPAMIDOL PER 1 ML: Performed by: ORTHOPAEDIC SURGERY

## 2022-01-01 PROCEDURE — 86140 C-REACTIVE PROTEIN: CPT | Performed by: INTERNAL MEDICINE

## 2022-01-01 PROCEDURE — 1125F AMNT PAIN NOTED PAIN PRSNT: CPT

## 2022-01-01 PROCEDURE — 93005 ELECTROCARDIOGRAM TRACING: CPT

## 2022-01-01 PROCEDURE — 83540 ASSAY OF IRON: CPT | Performed by: PSYCHIATRY & NEUROLOGY

## 2022-01-01 PROCEDURE — 82550 ASSAY OF CK (CPK): CPT | Performed by: INTERNAL MEDICINE

## 2022-01-01 PROCEDURE — 25010000002 PROPOFOL 1000 MG/100ML EMULSION: Performed by: NURSE ANESTHETIST, CERTIFIED REGISTERED

## 2022-01-01 PROCEDURE — 86900 BLOOD TYPING SEROLOGIC ABO: CPT | Performed by: STUDENT IN AN ORGANIZED HEALTH CARE EDUCATION/TRAINING PROGRAM

## 2022-01-01 PROCEDURE — G0439 PPPS, SUBSEQ VISIT: HCPCS

## 2022-01-01 PROCEDURE — 82140 ASSAY OF AMMONIA: CPT | Performed by: STUDENT IN AN ORGANIZED HEALTH CARE EDUCATION/TRAINING PROGRAM

## 2022-01-01 PROCEDURE — 90662 IIV NO PRSV INCREASED AG IM: CPT

## 2022-01-01 PROCEDURE — 83605 ASSAY OF LACTIC ACID: CPT | Performed by: INTERNAL MEDICINE

## 2022-01-01 PROCEDURE — 1160F RVW MEDS BY RX/DR IN RCRD: CPT

## 2022-01-01 PROCEDURE — 84145 PROCALCITONIN (PCT): CPT | Performed by: INTERNAL MEDICINE

## 2022-01-01 PROCEDURE — 99233 SBSQ HOSP IP/OBS HIGH 50: CPT | Performed by: CLINICAL NURSE SPECIALIST

## 2022-01-01 PROCEDURE — 84100 ASSAY OF PHOSPHORUS: CPT | Performed by: INTERNAL MEDICINE

## 2022-01-01 PROCEDURE — U0004 COV-19 TEST NON-CDC HGH THRU: HCPCS | Performed by: ORTHOPAEDIC SURGERY

## 2022-01-01 PROCEDURE — 93005 ELECTROCARDIOGRAM TRACING: CPT | Performed by: STUDENT IN AN ORGANIZED HEALTH CARE EDUCATION/TRAINING PROGRAM

## 2022-01-01 PROCEDURE — 84443 ASSAY THYROID STIM HORMONE: CPT | Performed by: PSYCHIATRY & NEUROLOGY

## 2022-01-01 PROCEDURE — 87635 SARS-COV-2 COVID-19 AMP PRB: CPT | Performed by: STUDENT IN AN ORGANIZED HEALTH CARE EDUCATION/TRAINING PROGRAM

## 2022-01-01 PROCEDURE — 73502 X-RAY EXAM HIP UNI 2-3 VIEWS: CPT

## 2022-01-01 PROCEDURE — 76000 FLUOROSCOPY <1 HR PHYS/QHP: CPT

## 2022-01-01 PROCEDURE — 83735 ASSAY OF MAGNESIUM: CPT | Performed by: INTERNAL MEDICINE

## 2022-01-01 PROCEDURE — 92523 SPEECH SOUND LANG COMPREHEN: CPT

## 2022-01-01 PROCEDURE — 83036 HEMOGLOBIN GLYCOSYLATED A1C: CPT | Performed by: PSYCHIATRY & NEUROLOGY

## 2022-01-01 PROCEDURE — 97535 SELF CARE MNGMENT TRAINING: CPT

## 2022-01-01 PROCEDURE — 1170F FXNL STATUS ASSESSED: CPT

## 2022-01-01 PROCEDURE — 85027 COMPLETE CBC AUTOMATED: CPT

## 2022-01-01 PROCEDURE — 82962 GLUCOSE BLOOD TEST: CPT

## 2022-01-01 PROCEDURE — 93306 TTE W/DOPPLER COMPLETE: CPT

## 2022-01-01 PROCEDURE — 25010000002 PERFLUTREN 6.52 MG/ML SUSPENSION: Performed by: INTERNAL MEDICINE

## 2022-01-01 PROCEDURE — 80053 COMPREHEN METABOLIC PANEL: CPT | Performed by: INTERNAL MEDICINE

## 2022-01-01 PROCEDURE — 96372 THER/PROPH/DIAG INJ SC/IM: CPT

## 2022-01-01 PROCEDURE — 0 IOPAMIDOL PER 1 ML: Performed by: STUDENT IN AN ORGANIZED HEALTH CARE EDUCATION/TRAINING PROGRAM

## 2022-01-01 PROCEDURE — 80053 COMPREHEN METABOLIC PANEL: CPT | Performed by: STUDENT IN AN ORGANIZED HEALTH CARE EDUCATION/TRAINING PROGRAM

## 2022-01-01 PROCEDURE — 99214 OFFICE O/P EST MOD 30 MIN: CPT | Performed by: FAMILY MEDICINE

## 2022-01-01 PROCEDURE — 80179 DRUG ASSAY SALICYLATE: CPT | Performed by: STUDENT IN AN ORGANIZED HEALTH CARE EDUCATION/TRAINING PROGRAM

## 2022-01-01 PROCEDURE — 80061 LIPID PANEL: CPT | Performed by: PSYCHIATRY & NEUROLOGY

## 2022-01-01 PROCEDURE — 25010000002 METHYLPREDNISOLONE PER 80 MG: Performed by: ORTHOPAEDIC SURGERY

## 2022-01-01 PROCEDURE — 93306 TTE W/DOPPLER COMPLETE: CPT | Performed by: INTERNAL MEDICINE

## 2022-01-01 PROCEDURE — G0009 ADMIN PNEUMOCOCCAL VACCINE: HCPCS

## 2022-01-01 PROCEDURE — 86901 BLOOD TYPING SEROLOGIC RH(D): CPT | Performed by: STUDENT IN AN ORGANIZED HEALTH CARE EDUCATION/TRAINING PROGRAM

## 2022-01-01 PROCEDURE — 36415 COLL VENOUS BLD VENIPUNCTURE: CPT | Performed by: PSYCHIATRY & NEUROLOGY

## 2022-01-01 PROCEDURE — 97162 PT EVAL MOD COMPLEX 30 MIN: CPT

## 2022-01-01 PROCEDURE — 81003 URINALYSIS AUTO W/O SCOPE: CPT | Performed by: STUDENT IN AN ORGANIZED HEALTH CARE EDUCATION/TRAINING PROGRAM

## 2022-01-01 PROCEDURE — 71045 X-RAY EXAM CHEST 1 VIEW: CPT

## 2022-01-01 PROCEDURE — 93005 ELECTROCARDIOGRAM TRACING: CPT | Performed by: INTERNAL MEDICINE

## 2022-01-01 PROCEDURE — 1159F MED LIST DOCD IN RCRD: CPT

## 2022-01-01 PROCEDURE — 80143 DRUG ASSAY ACETAMINOPHEN: CPT | Performed by: STUDENT IN AN ORGANIZED HEALTH CARE EDUCATION/TRAINING PROGRAM

## 2022-01-01 PROCEDURE — 70551 MRI BRAIN STEM W/O DYE: CPT

## 2022-01-01 RX ORDER — SODIUM CHLORIDE 0.9 % (FLUSH) 0.9 %
10 SYRINGE (ML) INJECTION AS NEEDED
Status: DISCONTINUED | OUTPATIENT
Start: 2022-01-01 | End: 2022-01-01 | Stop reason: HOSPADM

## 2022-01-01 RX ORDER — IBUPROFEN 600 MG/1
600 TABLET ORAL EVERY 6 HOURS PRN
Status: DISCONTINUED | OUTPATIENT
Start: 2022-01-01 | End: 2022-01-01 | Stop reason: HOSPADM

## 2022-01-01 RX ORDER — DROPERIDOL 2.5 MG/ML
0.62 INJECTION, SOLUTION INTRAMUSCULAR; INTRAVENOUS ONCE AS NEEDED
Status: DISCONTINUED | OUTPATIENT
Start: 2022-01-01 | End: 2022-01-01 | Stop reason: HOSPADM

## 2022-01-01 RX ORDER — SODIUM CHLORIDE 9 MG/ML
50 INJECTION, SOLUTION INTRAVENOUS CONTINUOUS
Status: DISCONTINUED | OUTPATIENT
Start: 2022-01-01 | End: 2022-01-01

## 2022-01-01 RX ORDER — QUETIAPINE FUMARATE 25 MG/1
25 TABLET, FILM COATED ORAL NIGHTLY
Start: 2022-01-01

## 2022-01-01 RX ORDER — DIPHENHYDRAMINE HCL 25 MG
25 CAPSULE ORAL NIGHTLY PRN
COMMUNITY
End: 2022-01-01 | Stop reason: HOSPADM

## 2022-01-01 RX ORDER — SODIUM CHLORIDE 0.9 % (FLUSH) 0.9 %
10 SYRINGE (ML) INJECTION AS NEEDED
Status: DISCONTINUED | OUTPATIENT
Start: 2022-01-01 | End: 2022-01-01 | Stop reason: SDUPTHER

## 2022-01-01 RX ORDER — OXYCODONE AND ACETAMINOPHEN 7.5; 325 MG/1; MG/1
2 TABLET ORAL EVERY 4 HOURS PRN
Status: DISCONTINUED | OUTPATIENT
Start: 2022-01-01 | End: 2022-01-01 | Stop reason: HOSPADM

## 2022-01-01 RX ORDER — ONDANSETRON 4 MG/1
4 TABLET, FILM COATED ORAL EVERY 6 HOURS PRN
Status: DISCONTINUED | OUTPATIENT
Start: 2022-01-01 | End: 2022-01-01 | Stop reason: HOSPADM

## 2022-01-01 RX ORDER — LANOLIN ALCOHOL/MO/W.PET/CERES
6 CREAM (GRAM) TOPICAL NIGHTLY
Status: DISCONTINUED | OUTPATIENT
Start: 2022-01-01 | End: 2022-01-01

## 2022-01-01 RX ORDER — LABETALOL HYDROCHLORIDE 5 MG/ML
5 INJECTION, SOLUTION INTRAVENOUS
Status: DISCONTINUED | OUTPATIENT
Start: 2022-01-01 | End: 2022-01-01 | Stop reason: HOSPADM

## 2022-01-01 RX ORDER — LABETALOL HYDROCHLORIDE 5 MG/ML
5 INJECTION, SOLUTION INTRAVENOUS ONCE
Status: COMPLETED | OUTPATIENT
Start: 2022-01-01 | End: 2022-01-01

## 2022-01-01 RX ORDER — IBUPROFEN 600 MG/1
600 TABLET ORAL ONCE AS NEEDED
Status: DISCONTINUED | OUTPATIENT
Start: 2022-01-01 | End: 2022-01-01 | Stop reason: HOSPADM

## 2022-01-01 RX ORDER — QUETIAPINE FUMARATE 25 MG/1
25 TABLET, FILM COATED ORAL NIGHTLY
Status: DISCONTINUED | OUTPATIENT
Start: 2022-01-01 | End: 2022-01-01 | Stop reason: HOSPADM

## 2022-01-01 RX ORDER — LANOLIN ALCOHOL/MO/W.PET/CERES
9 CREAM (GRAM) TOPICAL NIGHTLY
Status: DISCONTINUED | OUTPATIENT
Start: 2022-01-01 | End: 2022-01-01 | Stop reason: HOSPADM

## 2022-01-01 RX ORDER — AMLODIPINE BESYLATE 10 MG/1
10 TABLET ORAL
Start: 2022-01-01

## 2022-01-01 RX ORDER — ONDANSETRON 4 MG/1
4 TABLET, FILM COATED ORAL ONCE AS NEEDED
Status: DISCONTINUED | OUTPATIENT
Start: 2022-01-01 | End: 2022-01-01 | Stop reason: HOSPADM

## 2022-01-01 RX ORDER — ATORVASTATIN CALCIUM 40 MG/1
80 TABLET, FILM COATED ORAL NIGHTLY
Status: DISCONTINUED | OUTPATIENT
Start: 2022-01-01 | End: 2022-01-01 | Stop reason: HOSPADM

## 2022-01-01 RX ORDER — AMLODIPINE BESYLATE 10 MG/1
10 TABLET ORAL
Status: DISCONTINUED | OUTPATIENT
Start: 2022-01-01 | End: 2022-01-01 | Stop reason: HOSPADM

## 2022-01-01 RX ORDER — TRIAMCINOLONE ACETONIDE 40 MG/ML
40 INJECTION, SUSPENSION INTRA-ARTICULAR; INTRAMUSCULAR ONCE
Status: COMPLETED | OUTPATIENT
Start: 2022-01-01 | End: 2022-01-01

## 2022-01-01 RX ORDER — LIDOCAINE HYDROCHLORIDE 10 MG/ML
0.5 INJECTION, SOLUTION EPIDURAL; INFILTRATION; INTRACAUDAL; PERINEURAL ONCE AS NEEDED
Status: DISCONTINUED | OUTPATIENT
Start: 2022-01-01 | End: 2022-01-01 | Stop reason: SDUPTHER

## 2022-01-01 RX ORDER — ACETAMINOPHEN 325 MG/1
650 TABLET ORAL EVERY 4 HOURS PRN
Status: DISCONTINUED | OUTPATIENT
Start: 2022-01-01 | End: 2022-01-01 | Stop reason: HOSPADM

## 2022-01-01 RX ORDER — ROPIVACAINE HYDROCHLORIDE 5 MG/ML
INJECTION, SOLUTION EPIDURAL; INFILTRATION; PERINEURAL AS NEEDED
Status: DISCONTINUED | OUTPATIENT
Start: 2022-01-01 | End: 2022-01-01 | Stop reason: HOSPADM

## 2022-01-01 RX ORDER — ACETAMINOPHEN 325 MG/1
650 TABLET ORAL EVERY 4 HOURS PRN
COMMUNITY

## 2022-01-01 RX ORDER — ASPIRIN 300 MG/1
300 SUPPOSITORY RECTAL DAILY
Status: DISCONTINUED | OUTPATIENT
Start: 2022-01-01 | End: 2022-01-01

## 2022-01-01 RX ORDER — ACETAMINOPHEN 160 MG/5ML
650 SOLUTION ORAL EVERY 4 HOURS PRN
Status: DISCONTINUED | OUTPATIENT
Start: 2022-01-01 | End: 2022-01-01 | Stop reason: HOSPADM

## 2022-01-01 RX ORDER — DEXTROSE MONOHYDRATE 25 G/50ML
12.5 INJECTION, SOLUTION INTRAVENOUS AS NEEDED
Status: DISCONTINUED | OUTPATIENT
Start: 2022-01-01 | End: 2022-01-01 | Stop reason: HOSPADM

## 2022-01-01 RX ORDER — SODIUM CHLORIDE 0.9 % (FLUSH) 0.9 %
3 SYRINGE (ML) INJECTION AS NEEDED
Status: DISCONTINUED | OUTPATIENT
Start: 2022-01-01 | End: 2022-01-01 | Stop reason: HOSPADM

## 2022-01-01 RX ORDER — SODIUM CHLORIDE, SODIUM LACTATE, POTASSIUM CHLORIDE, CALCIUM CHLORIDE 600; 310; 30; 20 MG/100ML; MG/100ML; MG/100ML; MG/100ML
1000 INJECTION, SOLUTION INTRAVENOUS CONTINUOUS
Status: DISCONTINUED | OUTPATIENT
Start: 2022-01-01 | End: 2022-01-01 | Stop reason: HOSPADM

## 2022-01-01 RX ORDER — ROPINIROLE 1 MG/1
1 TABLET, FILM COATED ORAL NIGHTLY
Start: 2022-01-01

## 2022-01-01 RX ORDER — ACETAMINOPHEN 650 MG/1
650 SUPPOSITORY RECTAL EVERY 4 HOURS PRN
Status: DISCONTINUED | OUTPATIENT
Start: 2022-01-01 | End: 2022-01-01 | Stop reason: HOSPADM

## 2022-01-01 RX ORDER — GABAPENTIN 100 MG/1
100 CAPSULE ORAL EVERY 8 HOURS SCHEDULED
Status: DISCONTINUED | OUTPATIENT
Start: 2022-01-01 | End: 2022-01-01

## 2022-01-01 RX ORDER — SODIUM CHLORIDE 0.9 % (FLUSH) 0.9 %
10 SYRINGE (ML) INJECTION EVERY 12 HOURS SCHEDULED
Status: DISCONTINUED | OUTPATIENT
Start: 2022-01-01 | End: 2022-01-01 | Stop reason: HOSPADM

## 2022-01-01 RX ORDER — LIDOCAINE HYDROCHLORIDE 10 MG/ML
0.5 INJECTION, SOLUTION EPIDURAL; INFILTRATION; INTRACAUDAL; PERINEURAL ONCE AS NEEDED
Status: DISCONTINUED | OUTPATIENT
Start: 2022-01-01 | End: 2022-01-01 | Stop reason: HOSPADM

## 2022-01-01 RX ORDER — DULOXETIN HYDROCHLORIDE 20 MG/1
20 CAPSULE, DELAYED RELEASE ORAL DAILY
Qty: 31 CAPSULE | Refills: 0 | Status: SHIPPED | OUTPATIENT
Start: 2022-01-01

## 2022-01-01 RX ORDER — CHOLECALCIFEROL (VITAMIN D3) 125 MCG
500 CAPSULE ORAL DAILY
Status: DISCONTINUED | OUTPATIENT
Start: 2022-01-01 | End: 2022-01-01 | Stop reason: HOSPADM

## 2022-01-01 RX ORDER — ROPINIROLE 1 MG/1
1 TABLET, FILM COATED ORAL NIGHTLY
Status: DISCONTINUED | OUTPATIENT
Start: 2022-01-01 | End: 2022-01-01 | Stop reason: HOSPADM

## 2022-01-01 RX ORDER — ONDANSETRON 2 MG/ML
4 INJECTION INTRAMUSCULAR; INTRAVENOUS ONCE AS NEEDED
Status: DISCONTINUED | OUTPATIENT
Start: 2022-01-01 | End: 2022-01-01 | Stop reason: HOSPADM

## 2022-01-01 RX ORDER — ENALAPRILAT 2.5 MG/2ML
1.25 INJECTION INTRAVENOUS EVERY 6 HOURS PRN
Status: DISCONTINUED | OUTPATIENT
Start: 2022-01-01 | End: 2022-01-01 | Stop reason: HOSPADM

## 2022-01-01 RX ORDER — HYDROXYZINE HYDROCHLORIDE 25 MG/1
25 TABLET, FILM COATED ORAL 3 TIMES DAILY PRN
Status: ON HOLD | COMMUNITY
End: 2022-01-01

## 2022-01-01 RX ORDER — FENTANYL CITRATE 50 UG/ML
25 INJECTION, SOLUTION INTRAMUSCULAR; INTRAVENOUS
Status: DISCONTINUED | OUTPATIENT
Start: 2022-01-01 | End: 2022-01-01 | Stop reason: HOSPADM

## 2022-01-01 RX ORDER — BRIMONIDINE TARTRATE 2 MG/ML
SOLUTION/ DROPS OPHTHALMIC
COMMUNITY
Start: 2022-01-01

## 2022-01-01 RX ORDER — FLUMAZENIL 0.1 MG/ML
0.2 INJECTION INTRAVENOUS AS NEEDED
Status: DISCONTINUED | OUTPATIENT
Start: 2022-01-01 | End: 2022-01-01 | Stop reason: HOSPADM

## 2022-01-01 RX ORDER — LIDOCAINE HYDROCHLORIDE 20 MG/ML
INJECTION, SOLUTION EPIDURAL; INFILTRATION; INTRACAUDAL; PERINEURAL AS NEEDED
Status: DISCONTINUED | OUTPATIENT
Start: 2022-01-01 | End: 2022-01-01 | Stop reason: SURG

## 2022-01-01 RX ORDER — SODIUM CHLORIDE, SODIUM LACTATE, POTASSIUM CHLORIDE, CALCIUM CHLORIDE 600; 310; 30; 20 MG/100ML; MG/100ML; MG/100ML; MG/100ML
9 INJECTION, SOLUTION INTRAVENOUS CONTINUOUS
Status: DISCONTINUED | OUTPATIENT
Start: 2022-01-01 | End: 2022-01-01 | Stop reason: HOSPADM

## 2022-01-01 RX ORDER — OXYCODONE AND ACETAMINOPHEN 10; 325 MG/1; MG/1
1 TABLET ORAL ONCE AS NEEDED
Status: DISCONTINUED | OUTPATIENT
Start: 2022-01-01 | End: 2022-01-01 | Stop reason: HOSPADM

## 2022-01-01 RX ORDER — ENOXAPARIN SODIUM 100 MG/ML
40 INJECTION SUBCUTANEOUS EVERY 24 HOURS
Status: DISCONTINUED | OUTPATIENT
Start: 2022-01-01 | End: 2022-01-01 | Stop reason: HOSPADM

## 2022-01-01 RX ORDER — ASPIRIN 81 MG/1
81 TABLET, CHEWABLE ORAL DAILY
Start: 2022-01-01

## 2022-01-01 RX ORDER — ONDANSETRON 2 MG/ML
4 INJECTION INTRAMUSCULAR; INTRAVENOUS EVERY 6 HOURS PRN
Status: DISCONTINUED | OUTPATIENT
Start: 2022-01-01 | End: 2022-01-01 | Stop reason: HOSPADM

## 2022-01-01 RX ORDER — ATORVASTATIN CALCIUM 80 MG/1
80 TABLET, FILM COATED ORAL NIGHTLY
Qty: 90 TABLET
Start: 2022-01-01

## 2022-01-01 RX ORDER — TRAZODONE HYDROCHLORIDE 50 MG/1
50 TABLET ORAL NIGHTLY
Status: DISCONTINUED | OUTPATIENT
Start: 2022-01-01 | End: 2022-01-01

## 2022-01-01 RX ORDER — PROPOFOL 10 MG/ML
INJECTION, EMULSION INTRAVENOUS AS NEEDED
Status: DISCONTINUED | OUTPATIENT
Start: 2022-01-01 | End: 2022-01-01 | Stop reason: SURG

## 2022-01-01 RX ORDER — SODIUM CHLORIDE, SODIUM LACTATE, POTASSIUM CHLORIDE, CALCIUM CHLORIDE 600; 310; 30; 20 MG/100ML; MG/100ML; MG/100ML; MG/100ML
100 INJECTION, SOLUTION INTRAVENOUS CONTINUOUS PRN
Status: DISCONTINUED | OUTPATIENT
Start: 2022-01-01 | End: 2022-01-01 | Stop reason: HOSPADM

## 2022-01-01 RX ORDER — ASPIRIN 81 MG/1
81 TABLET, CHEWABLE ORAL DAILY
Status: DISCONTINUED | OUTPATIENT
Start: 2022-01-01 | End: 2022-01-01 | Stop reason: HOSPADM

## 2022-01-01 RX ORDER — METHYLPREDNISOLONE ACETATE 80 MG/ML
INJECTION, SUSPENSION INTRA-ARTICULAR; INTRALESIONAL; INTRAMUSCULAR; SOFT TISSUE AS NEEDED
Status: DISCONTINUED | OUTPATIENT
Start: 2022-01-01 | End: 2022-01-01 | Stop reason: HOSPADM

## 2022-01-01 RX ORDER — NALOXONE HCL 0.4 MG/ML
0.4 VIAL (ML) INJECTION AS NEEDED
Status: DISCONTINUED | OUTPATIENT
Start: 2022-01-01 | End: 2022-01-01 | Stop reason: HOSPADM

## 2022-01-01 RX ADMIN — ACETAMINOPHEN 650 MG: 325 TABLET, FILM COATED ORAL at 20:43

## 2022-01-01 RX ADMIN — IOPAMIDOL 100 ML: 755 INJECTION, SOLUTION INTRAVENOUS at 09:21

## 2022-01-01 RX ADMIN — PROPOFOL 50 MG: 10 INJECTION, EMULSION INTRAVENOUS at 15:10

## 2022-01-01 RX ADMIN — ATORVASTATIN CALCIUM 80 MG: 40 TABLET, FILM COATED ORAL at 22:02

## 2022-01-01 RX ADMIN — ROPINIROLE HYDROCHLORIDE 1 MG: 1 TABLET, FILM COATED ORAL at 20:59

## 2022-01-01 RX ADMIN — Medication 500 MCG: at 08:38

## 2022-01-01 RX ADMIN — Medication 10 ML: at 08:33

## 2022-01-01 RX ADMIN — QUETIAPINE FUMARATE 25 MG: 25 TABLET, FILM COATED ORAL at 20:59

## 2022-01-01 RX ADMIN — ASPIRIN 81 MG: 81 TABLET, CHEWABLE ORAL at 08:38

## 2022-01-01 RX ADMIN — SODIUM CHLORIDE, POTASSIUM CHLORIDE, SODIUM LACTATE AND CALCIUM CHLORIDE 1000 ML: 600; 310; 30; 20 INJECTION, SOLUTION INTRAVENOUS at 09:48

## 2022-01-01 RX ADMIN — SODIUM CHLORIDE 50 ML/HR: 9 INJECTION, SOLUTION INTRAVENOUS at 13:38

## 2022-01-01 RX ADMIN — SODIUM CHLORIDE 50 ML/HR: 9 INJECTION, SOLUTION INTRAVENOUS at 15:23

## 2022-01-01 RX ADMIN — ATORVASTATIN CALCIUM 80 MG: 40 TABLET, FILM COATED ORAL at 20:09

## 2022-01-01 RX ADMIN — ACETAMINOPHEN 650 MG: 325 TABLET, FILM COATED ORAL at 06:47

## 2022-01-01 RX ADMIN — Medication 10 ML: at 15:21

## 2022-01-01 RX ADMIN — Medication 10 ML: at 20:16

## 2022-01-01 RX ADMIN — CAMPHOR (SYNTHETIC), MENTHOL, UNSPECIFIED FORM, AND METHYL SALICYLATE 1 APPLICATION: 40; 100; 300 CREAM TOPICAL at 00:36

## 2022-01-01 RX ADMIN — LABETALOL HYDROCHLORIDE 5 MG: 5 INJECTION INTRAVENOUS at 08:56

## 2022-01-01 RX ADMIN — FENTANYL CITRATE 25 MCG: 50 INJECTION INTRAMUSCULAR; INTRAVENOUS at 13:47

## 2022-01-01 RX ADMIN — ACETAMINOPHEN 650 MG: 325 TABLET, FILM COATED ORAL at 02:50

## 2022-01-01 RX ADMIN — GABAPENTIN 100 MG: 100 CAPSULE ORAL at 10:38

## 2022-01-01 RX ADMIN — ATORVASTATIN CALCIUM 80 MG: 40 TABLET, FILM COATED ORAL at 20:59

## 2022-01-01 RX ADMIN — ASPIRIN 81 MG: 81 TABLET, CHEWABLE ORAL at 08:39

## 2022-01-01 RX ADMIN — PERFLUTREN 13.04 MG: 6.52 INJECTION, SUSPENSION INTRAVENOUS at 16:35

## 2022-01-01 RX ADMIN — SODIUM CHLORIDE 5 MG/HR: 9 INJECTION, SOLUTION INTRAVENOUS at 10:37

## 2022-01-01 RX ADMIN — AMLODIPINE BESYLATE 10 MG: 10 TABLET ORAL at 08:39

## 2022-01-01 RX ADMIN — ENOXAPARIN SODIUM 40 MG: 100 INJECTION SUBCUTANEOUS at 13:58

## 2022-01-01 RX ADMIN — ENOXAPARIN SODIUM 40 MG: 100 INJECTION SUBCUTANEOUS at 16:31

## 2022-01-01 RX ADMIN — ASPIRIN 300 MG: 300 SUPPOSITORY RECTAL at 12:50

## 2022-01-01 RX ADMIN — QUETIAPINE FUMARATE 25 MG: 25 TABLET, FILM COATED ORAL at 20:09

## 2022-01-01 RX ADMIN — LIDOCAINE HYDROCHLORIDE 100 MG: 20 INJECTION, SOLUTION EPIDURAL; INFILTRATION; INTRACAUDAL; PERINEURAL at 15:08

## 2022-01-01 RX ADMIN — ENOXAPARIN SODIUM 40 MG: 100 INJECTION SUBCUTANEOUS at 13:36

## 2022-01-01 RX ADMIN — ACETAMINOPHEN 650 MG: 325 TABLET, FILM COATED ORAL at 08:42

## 2022-01-01 RX ADMIN — Medication 10 ML: at 08:40

## 2022-01-01 RX ADMIN — PROPOFOL 30 MG: 10 INJECTION, EMULSION INTRAVENOUS at 15:16

## 2022-01-01 RX ADMIN — SODIUM CHLORIDE, POTASSIUM CHLORIDE, SODIUM LACTATE AND CALCIUM CHLORIDE 1000 ML: 600; 310; 30; 20 INJECTION, SOLUTION INTRAVENOUS at 10:28

## 2022-01-01 RX ADMIN — SODIUM CHLORIDE 50 ML/HR: 9 INJECTION, SOLUTION INTRAVENOUS at 08:38

## 2022-01-01 RX ADMIN — AMLODIPINE BESYLATE 10 MG: 10 TABLET ORAL at 01:38

## 2022-01-01 RX ADMIN — Medication 500 MCG: at 10:38

## 2022-01-01 RX ADMIN — Medication 10 ML: at 20:59

## 2022-01-01 RX ADMIN — ASPIRIN 81 MG: 81 TABLET, CHEWABLE ORAL at 08:34

## 2022-01-01 RX ADMIN — TRIAMCINOLONE ACETONIDE 40 MG: 40 INJECTION, SUSPENSION INTRA-ARTICULAR; INTRAMUSCULAR at 14:22

## 2022-01-01 RX ADMIN — QUETIAPINE FUMARATE 25 MG: 25 TABLET, FILM COATED ORAL at 00:36

## 2022-01-01 RX ADMIN — ACETAMINOPHEN 650 MG: 325 TABLET, FILM COATED ORAL at 12:18

## 2022-01-01 RX ADMIN — ENOXAPARIN SODIUM 40 MG: 100 INJECTION SUBCUTANEOUS at 15:40

## 2022-01-01 RX ADMIN — Medication 6 MG: at 20:09

## 2022-01-01 RX ADMIN — ENALAPRILAT 1.25 MG: 1.25 INJECTION INTRAVENOUS at 00:22

## 2022-01-01 RX ADMIN — Medication 9 MG: at 20:59

## 2022-01-28 ENCOUNTER — HOSPITAL ENCOUNTER (EMERGENCY)
Facility: HOSPITAL | Age: 67
Discharge: HOME | End: 2022-01-28
Admitting: EMERGENCY MEDICINE

## 2022-01-28 ENCOUNTER — APPOINTMENT (OUTPATIENT)
Dept: ULTRASOUND IMAGING | Facility: HOSPITAL | Age: 67
End: 2022-01-28

## 2022-01-28 ENCOUNTER — APPOINTMENT (OUTPATIENT)
Dept: GENERAL RADIOLOGY | Facility: HOSPITAL | Age: 67
End: 2022-01-28

## 2022-01-28 VITALS
DIASTOLIC BLOOD PRESSURE: 89 MMHG | TEMPERATURE: 97.8 F | WEIGHT: 180 LBS | SYSTOLIC BLOOD PRESSURE: 159 MMHG | RESPIRATION RATE: 14 BRPM | BODY MASS INDEX: 25.77 KG/M2 | HEART RATE: 68 BPM | HEIGHT: 70 IN | OXYGEN SATURATION: 96 %

## 2022-01-28 DIAGNOSIS — M79.604 RIGHT LEG PAIN: Primary | ICD-10-CM

## 2022-01-28 PROCEDURE — 99283 EMERGENCY DEPT VISIT LOW MDM: CPT

## 2022-01-28 PROCEDURE — 93971 EXTREMITY STUDY: CPT

## 2022-01-28 PROCEDURE — 73552 X-RAY EXAM OF FEMUR 2/>: CPT

## 2022-01-28 PROCEDURE — 63710000001 ONDANSETRON ODT 4 MG TABLET DISPERSIBLE: Performed by: PHYSICIAN ASSISTANT

## 2022-01-28 PROCEDURE — 25010000002 MORPHINE PER 10 MG: Performed by: EMERGENCY MEDICINE

## 2022-01-28 PROCEDURE — 96372 THER/PROPH/DIAG INJ SC/IM: CPT

## 2022-01-28 PROCEDURE — 93971 EXTREMITY STUDY: CPT | Performed by: SURGERY

## 2022-01-28 RX ORDER — ONDANSETRON 4 MG/1
4 TABLET, ORALLY DISINTEGRATING ORAL ONCE
Status: COMPLETED | OUTPATIENT
Start: 2022-01-28 | End: 2022-01-28

## 2022-01-28 RX ORDER — LIDOCAINE 50 MG/G
1 PATCH TOPICAL EVERY 24 HOURS
Qty: 6 EACH | Refills: 0 | Status: SHIPPED | OUTPATIENT
Start: 2022-01-28 | End: 2022-01-01

## 2022-01-28 RX ORDER — TIZANIDINE 4 MG/1
4 TABLET ORAL EVERY 6 HOURS PRN
Qty: 12 TABLET | Refills: 0 | Status: ON HOLD | OUTPATIENT
Start: 2022-01-28 | End: 2022-01-01

## 2022-01-28 RX ORDER — GABAPENTIN 100 MG/1
200 CAPSULE ORAL ONCE
Status: DISCONTINUED | OUTPATIENT
Start: 2022-01-28 | End: 2022-01-28 | Stop reason: HOSPADM

## 2022-01-28 RX ORDER — HYDROXYZINE HYDROCHLORIDE 25 MG/1
25 TABLET, FILM COATED ORAL EVERY 6 HOURS PRN
Qty: 12 TABLET | Refills: 0 | Status: SHIPPED | OUTPATIENT
Start: 2022-01-28 | End: 2022-01-01

## 2022-01-28 RX ADMIN — MORPHINE SULFATE 4 MG: 4 INJECTION, SOLUTION INTRAMUSCULAR; INTRAVENOUS at 14:40

## 2022-01-28 RX ADMIN — ONDANSETRON 4 MG: 4 TABLET, ORALLY DISINTEGRATING ORAL at 14:40

## 2022-04-21 NOTE — ANESTHESIA PREPROCEDURE EVALUATION
Anesthesia Evaluation     Patient summary reviewed   no history of anesthetic complications:  NPO Solid Status: > 8 hours             Airway   Mallampati: II  Dental    (+) upper dentures and lower dentures    Pulmonary    (+) a smoker,   (-) COPD, asthma, sleep apnea  Cardiovascular     (-) pacemaker, past MI, angina, cardiac stents      Neuro/Psych  (-) seizures, TIA, CVA  GI/Hepatic/Renal/Endo    (-) GERD, liver disease, no renal disease, diabetes    Musculoskeletal     Abdominal    Substance History      OB/GYN          Other                        Anesthesia Plan    ASA 2     MAC       Anesthetic plan, all risks, benefits, and alternatives have been provided, discussed and informed consent has been obtained with: patient.        CODE STATUS:

## 2022-04-21 NOTE — ANESTHESIA POSTPROCEDURE EVALUATION
Patient: Digna Russell    Procedure Summary     Date: 04/21/22 Room / Location:  [facility]  /  [facility]    Anesthesia Start: 1505 Anesthesia Stop: 1530    Procedure: FLUOROSCOPIC GUIDED RIGHT HIP AND TROCHANTERIC BURSA INJECTION (Right Hip) Diagnosis: (RIGHT HIP PAIN)    Surgeons: Ronaldo Calles MD Provider: Otilio Schultz CRNA    Anesthesia Type: MAC ASA Status: 2          Anesthesia Type: MAC    Vitals  Vitals Value Taken Time   /94 04/21/22 1527   Temp     Pulse 62 04/21/22 1530   Resp     SpO2 95 % 04/21/22 1530   Vitals shown include unvalidated device data.        Post Anesthesia Care and Evaluation    Patient location during evaluation: PHASE II  Patient participation: complete - patient participated  Level of consciousness: awake  Pain score: 0  Pain management: adequate  Airway patency: patent  Anesthetic complications: No anesthetic complications  PONV Status: none  Cardiovascular status: acceptable  Respiratory status: acceptable  Hydration status: acceptable

## 2022-06-20 ENCOUNTER — NURSE TRIAGE (OUTPATIENT)
Dept: OTHER | Facility: CLINIC | Age: 67
End: 2022-06-20

## 2022-06-20 NOTE — TELEPHONE ENCOUNTER
Received call from [facility]; Patient with Red Flag Complaint requesting to establish care with [address]. Subjective: Caller states \"not sleeping\"     Current Symptoms: not sleeping week for over 1 year, dizziness--pt will stop doing activities and will sit down and \"black out\" pt reports that she doesn't know how long she's out, denies chest pain, denies SOB, pt reports that OTC sleep aids and prescribed sleep aids do not work,     Onset: several years ago; unchanged    Associated Symptoms: reduced activity, increased wakefulness    Pain Severity: 0/10; N/A; none    Temperature: denies     What has been tried: Ambien working     LMP: NA Pregnant: NA    Recommended disposition: See PCP within 3 Days    Care advice provided, patient verbalizes understanding; denies any other questions or concerns; instructed to call back for any new or worsening symptoms. Patient/Caller agrees with recommended disposition; writer provided warm transfer to Nimesh Padilla at [facility] for appointment scheduling    Attention Provider: Thank you for allowing me to participate in the care of your patient. The patient was connected to triage in response to information provided to the [facility]. Please do not respond through this encounter as the response is not directed to a shared pool.         Reason for Disposition   Insomnia interferes with work or school    Protocols used: [id number]-TO

## 2022-06-21 ENCOUNTER — OFFICE VISIT (OUTPATIENT)
Dept: FAMILY MEDICINE CLINIC | Age: 67
End: 2022-06-21
Payer: MEDICARE

## 2022-06-21 VITALS
OXYGEN SATURATION: 97 % | TEMPERATURE: 97.9 F | BODY MASS INDEX: 24.2 KG/M2 | SYSTOLIC BLOOD PRESSURE: 140 MMHG | DIASTOLIC BLOOD PRESSURE: 88 MMHG | HEART RATE: 68 BPM | HEIGHT: 70 IN | WEIGHT: 169 LBS

## 2022-06-21 DIAGNOSIS — Z76.89 ESTABLISHING CARE WITH NEW DOCTOR, ENCOUNTER FOR: Primary | ICD-10-CM

## 2022-06-21 DIAGNOSIS — Z13.220 SCREENING FOR LIPOID DISORDERS: ICD-10-CM

## 2022-06-21 DIAGNOSIS — G47.9 RESTLESS SLEEPER: ICD-10-CM

## 2022-06-21 DIAGNOSIS — G47.00 INSOMNIA, UNSPECIFIED TYPE: ICD-10-CM

## 2022-06-21 PROCEDURE — 3017F COLORECTAL CA SCREEN DOC REV: CPT | Performed by: NURSE PRACTITIONER

## 2022-06-21 PROCEDURE — G8420 CALC BMI NORM PARAMETERS: HCPCS | Performed by: NURSE PRACTITIONER

## 2022-06-21 PROCEDURE — 99203 OFFICE O/P NEW LOW 30 MIN: CPT | Performed by: NURSE PRACTITIONER

## 2022-06-21 PROCEDURE — G8399 PT W/DXA RESULTS DOCUMENT: HCPCS | Performed by: NURSE PRACTITIONER

## 2022-06-21 PROCEDURE — 1036F TOBACCO NON-USER: CPT | Performed by: NURSE PRACTITIONER

## 2022-06-21 PROCEDURE — 1090F PRES/ABSN URINE INCON ASSESS: CPT | Performed by: NURSE PRACTITIONER

## 2022-06-21 PROCEDURE — G8427 DOCREV CUR MEDS BY ELIG CLIN: HCPCS | Performed by: NURSE PRACTITIONER

## 2022-06-21 PROCEDURE — 1123F ACP DISCUSS/DSCN MKR DOCD: CPT | Performed by: NURSE PRACTITIONER

## 2022-06-21 RX ORDER — DORZOLAMIDE HYDROCHLORIDE AND TIMOLOL MALEATE 20; 5 MG/ML; MG/ML
SOLUTION/ DROPS OPHTHALMIC
COMMUNITY
Start: 2022-05-28

## 2022-06-21 RX ORDER — BIMATOPROST 0.01 %
DROPS OPHTHALMIC (EYE)
COMMUNITY
Start: 2022-06-02

## 2022-06-21 RX ORDER — BRIMONIDINE TARTRATE 2 MG/ML
SOLUTION/ DROPS OPHTHALMIC
COMMUNITY
Start: 2022-05-12

## 2022-06-21 RX ORDER — HYDROXYZINE HYDROCHLORIDE 25 MG/1
25 TABLET, FILM COATED ORAL NIGHTLY
Qty: 30 TABLET | Refills: 0 | Status: SHIPPED | OUTPATIENT
Start: 2022-06-21 | End: 2022-07-21

## 2022-06-21 ASSESSMENT — ENCOUNTER SYMPTOMS
GASTROINTESTINAL NEGATIVE: 1
ALLERGIC/IMMUNOLOGIC NEGATIVE: 1
RESPIRATORY NEGATIVE: 1
EYES NEGATIVE: 1

## 2022-06-21 ASSESSMENT — PATIENT HEALTH QUESTIONNAIRE - PHQ9
SUM OF ALL RESPONSES TO PHQ9 QUESTIONS 1 & 2: 0
SUM OF ALL RESPONSES TO PHQ QUESTIONS 1-9: 0
1. LITTLE INTEREST OR PLEASURE IN DOING THINGS: 0
2. FEELING DOWN, DEPRESSED OR HOPELESS: 0
SUM OF ALL RESPONSES TO PHQ QUESTIONS 1-9: 0

## 2022-06-21 NOTE — PROGRESS NOTES
[facility] PHYSICIAN SERVICES  [facility]  [id number] [place] [address] [address] [address]  Dept: [phone]  Dept Fax: [phone]  Loc: [phone]    Reyes Lott is a 73 y.o. female who presents today for her medical conditions/complaints as noted below. Reyes Lott is c/o of Insomnia (having trouble staying asleep )        HPI:   She presents today to establish care. States she was seeing Dr. Armando Luo at [facility] last year. She states \"COVID hit and I didn't see any doctors\". She states she was prescribed Ambien for sleep back in 2021 by Dr. Armando Luo but has been off medicine for a while. States she was taken Tylenol PM, but switched to antihistamine because it worked better. She states she has been taking total 75mg of the anti-histamine pill. She states Melatonin does not help. She reports feeling restless at night. Denies any stress. States she sees an eye doctor with glaucoma left eye.    HPI   Chief Complaint   Patient presents with    Insomnia     having trouble staying asleep      Past Medical History:   Diagnosis Date    Deafness     deaf in right ear 40% in left ear    Endometrial ca ([address] 9/2015    Insomnia     Left breast mass     had to have compression view and u/s    Scoliosis 1986      Past Surgical History:   Procedure Laterality Date    COLONOSCOPY  2015    HYSTERECTOMY (CERVIX STATUS UNKNOWN)  10/2016     [place]     INNER EAR SURGERY      TONSILLECTOMY AND ADENOIDECTOMY      TUBAL LIGATION  1980       Vitals 6/21/2022 5/17/2017 5/12/2016 2/8/2016 1/7/2016 57/56/8162   SYSTOLIC 384 355 205 156 982 202   DIASTOLIC 88 81 78 82 72 70   Position - Sitting - - - -   Pulse 68 60 70 76 72 66   Temp 97.9 - 98 98.1 98 98   Resp - - 18 16 18 16   SpO2 97 - 98 98 99 99   Weight 169 lb 188 lb 178 lb 153 lb 156 lb 12.8 oz 154 lb   Height 5' 10\" 5' 10\" 5' 10\" 5' 10\" 5' 10\" 5' 10\"   Body mass index 24.25 kg/m2 26.97 kg/m2 25.54 kg/m2 21.95 kg/m2 22.5 Negative. Eyes: Negative. Respiratory: Negative. Cardiovascular: Negative. Gastrointestinal: Negative. Endocrine: Negative. Genitourinary: Negative. Musculoskeletal: Negative. Skin: Negative. Allergic/Immunologic: Negative. Neurological: Negative. Hematological: Negative. Psychiatric/Behavioral: Positive for sleep disturbance. Negative for agitation, behavioral problems and confusion. The patient is not nervous/anxious. Restlessness       Objective:     Physical Exam  Vitals and nursing note reviewed. Constitutional:       Appearance: Normal appearance. HENT:      Head: Normocephalic. Nose: Nose normal.      Mouth/Throat:      Mouth: Mucous membranes are dry. Eyes:      General:         Right eye: No discharge. Left eye: No discharge. Comments: Bulging eyes   Cardiovascular:      Rate and Rhythm: Normal rate and regular rhythm. Pulses: Normal pulses. Heart sounds: Normal heart sounds. Pulmonary:      Effort: Pulmonary effort is normal.      Breath sounds: Normal breath sounds. Abdominal:      General: Abdomen is flat. Palpations: Abdomen is soft. Musculoskeletal:         General: Normal range of motion. Cervical back: Normal range of motion. Skin:     General: Skin is warm and dry. Capillary Refill: Capillary refill takes less than 2 seconds. Neurological:      Mental Status: She is alert and oriented to person, place, and time. Psychiatric:         Mood and Affect: Mood normal.         Behavior: Behavior normal.         Thought Content: Thought content normal.         Judgment: Judgment normal.       BP (!) 140/88   Pulse 68   Temp 97.9 °F (36.6 °C)   Ht 5' 10\" (1.778 m)   Wt 169 lb (76.7 kg)   SpO2 97%   BMI 24.25 kg/m²     Assessment:       Diagnosis Orders   1. Establishing care with new doctor, encounter for     2. Insomnia, unspecified type     3.  Restless sleeper           Plan:   PATIENT DEFERRED COLONOSCOPY AND MAMMOGRAM  Will get labs in the morning fasting. Start Hydroxyzine 25mg at bedtime  Follow up in 4 weeks, sooner if needed    PDMP Monitoring:    Last PDMP Sebastián as Reviewed [facility]):  Review User Review Instant Review Result            Urine Drug Screenings (1 yr)    No resulted procedures found. Medication Contract and Consent for Opioid Use Documents Filed     Patient Documents     Type of Document Status Date Received Received By Description    Medication Contract Signed 10/28/2013 10:29 AM Emilie Escudero                  Patient given educational materials -see patient instructions. Discussed use, benefit, and side effects of prescribed medications. All patient questions answered. Pt voiced understanding. Reviewed health maintenance. Instructed to continue currentmedications, diet and exercise. Patient agreed with treatment plan. Follow up as directed. MEDICATIONS:  No orders of the defined types were placed in this encounter. ORDERS:  No orders of the defined types were placed in this encounter. Follow-up:  Return in about 4 weeks (around 7/19/2022) for follow up on medication. PATIENT INSTRUCTIONS:  There are no Patient Instructions on file for this visit. Electronically signed by ANNE Tran on 6/21/2022 at 11:54 AM    EMR Dragon/transcription disclaimer:  Much of thisencounter note is electronic transcription/translation of spoken language to printed texts. The electronic translation of spoken language may be erroneous, or at times, nonsensical words or phrases may be inadvertentlytranscribed.   Although I have reviewed the note for such errors, some may still exist.

## 2022-06-22 DIAGNOSIS — Z76.89 ESTABLISHING CARE WITH NEW DOCTOR, ENCOUNTER FOR: ICD-10-CM

## 2022-06-22 DIAGNOSIS — G47.9 RESTLESS SLEEPER: ICD-10-CM

## 2022-06-22 DIAGNOSIS — Z13.220 SCREENING FOR LIPOID DISORDERS: ICD-10-CM

## 2022-06-22 DIAGNOSIS — G47.00 INSOMNIA, UNSPECIFIED TYPE: ICD-10-CM

## 2022-06-22 LAB
ALBUMIN SERPL-MCNC: 4.3 G/DL (ref 3.5–5.2)
ALP BLD-CCNC: 116 U/L (ref 35–104)
ALT SERPL-CCNC: 15 U/L (ref 5–33)
ANION GAP SERPL CALCULATED.3IONS-SCNC: 15 MMOL/L (ref 7–19)
AST SERPL-CCNC: 20 U/L (ref 5–32)
BASOPHILS ABSOLUTE: 0.1 K/UL (ref 0–0.2)
BASOPHILS RELATIVE PERCENT: 0.4 % (ref 0–1)
BILIRUB SERPL-MCNC: 0.3 MG/DL (ref 0.2–1.2)
BUN BLDV-MCNC: 13 MG/DL (ref 8–23)
CALCIUM SERPL-MCNC: 10.8 MG/DL (ref 8.8–10.2)
CHLORIDE BLD-SCNC: 101 MMOL/L (ref 98–111)
CHOLESTEROL, FASTING: 174 MG/DL (ref 160–199)
CO2: 24 MMOL/L (ref 22–29)
CREAT SERPL-MCNC: 1 MG/DL (ref 0.5–0.9)
EOSINOPHILS ABSOLUTE: 0.2 K/UL (ref 0–0.6)
EOSINOPHILS RELATIVE PERCENT: 1.8 % (ref 0–5)
GFR AFRICAN AMERICAN: >59
GFR NON-AFRICAN AMERICAN: 56
GLUCOSE BLD-MCNC: 109 MG/DL (ref 74–109)
HCT VFR BLD CALC: 42.3 % (ref 37–47)
HDLC SERPL-MCNC: 53 MG/DL (ref 65–121)
HEMOGLOBIN: 13 G/DL (ref 12–16)
IMMATURE GRANULOCYTES #: 0.1 K/UL
LDL CHOLESTEROL CALCULATED: 92 MG/DL
LYMPHOCYTES ABSOLUTE: 2.9 K/UL (ref 1.1–4.5)
LYMPHOCYTES RELATIVE PERCENT: 24.6 % (ref 20–40)
MCH RBC QN AUTO: 30.2 PG (ref 27–31)
MCHC RBC AUTO-ENTMCNC: 30.7 G/DL (ref 33–37)
MCV RBC AUTO: 98.1 FL (ref 81–99)
MONOCYTES ABSOLUTE: 0.6 K/UL (ref 0–0.9)
MONOCYTES RELATIVE PERCENT: 4.9 % (ref 0–10)
NEUTROPHILS ABSOLUTE: 7.9 K/UL (ref 1.5–7.5)
NEUTROPHILS RELATIVE PERCENT: 67.9 % (ref 50–65)
PDW BLD-RTO: 14.2 % (ref 11.5–14.5)
PLATELET # BLD: 417 K/UL (ref 130–400)
PMV BLD AUTO: 10.2 FL (ref 9.4–12.3)
POTASSIUM SERPL-SCNC: 4.3 MMOL/L (ref 3.5–5)
RBC # BLD: 4.31 M/UL (ref 4.2–5.4)
SODIUM BLD-SCNC: 140 MMOL/L (ref 136–145)
T4 FREE: 1.33 NG/DL (ref 0.93–1.7)
TOTAL PROTEIN: 7.3 G/DL (ref 6.6–8.7)
TRIGLYCERIDE, FASTING: 145 MG/DL (ref 0–149)
TSH SERPL DL<=0.05 MIU/L-ACNC: 1.44 UIU/ML (ref 0.27–4.2)
WBC # BLD: 11.6 K/UL (ref 4.8–10.8)

## 2022-06-23 DIAGNOSIS — R74.8 ELEVATED ALKALINE PHOSPHATASE LEVEL: ICD-10-CM

## 2022-06-23 DIAGNOSIS — R94.4 DECREASED GFR: ICD-10-CM

## 2022-06-23 DIAGNOSIS — R79.89 ELEVATED SERUM CREATININE: ICD-10-CM

## 2022-06-23 DIAGNOSIS — E83.52 HYPERCALCEMIA: Primary | ICD-10-CM

## 2022-06-23 DIAGNOSIS — N95.1 MENOPAUSAL STATE: ICD-10-CM

## 2022-06-29 ENCOUNTER — TELEPHONE (OUTPATIENT)
Dept: FAMILY MEDICINE CLINIC | Age: 67
End: 2022-06-29

## 2022-06-29 NOTE — TELEPHONE ENCOUNTER
Patient called and left a voicemail asking for a call back. I returned the call but there was no answer.  LM on voicemail

## 2022-07-01 ENCOUNTER — TELEPHONE (OUTPATIENT)
Dept: FAMILY MEDICINE CLINIC | Age: 67
End: 2022-07-01

## 2022-07-01 NOTE — TELEPHONE ENCOUNTER
Patient said had bone density done before covid hit. She also had her thyroid checked and the doctor told her \"there was nothing to worry about. \" I let her know that you want her to have more labs done as well as a UA and bone density.

## 2022-07-05 NOTE — TELEPHONE ENCOUNTER
Please call the patient and let her know I was able to locate the bone density she had in October 2019 and it reported osteopenia. She was to take Calcium 1200mg daily and 800IU of Vitamin D and repeat in 2 years, so I would like for her to have a repeat along with labs. Also she is overdue for mammogram (last done 8/8/19) and colonoscopy, would she like to go ahead and schedule these?   ANNE Olivia

## 2022-07-07 DIAGNOSIS — R74.8 ELEVATED ALKALINE PHOSPHATASE LEVEL: ICD-10-CM

## 2022-07-07 DIAGNOSIS — R94.4 DECREASED GFR: ICD-10-CM

## 2022-07-07 DIAGNOSIS — E83.52 HYPERCALCEMIA: ICD-10-CM

## 2022-07-07 DIAGNOSIS — R79.89 ELEVATED SERUM CREATININE: ICD-10-CM

## 2022-07-07 LAB
BACTERIA: NEGATIVE /HPF
BILIRUBIN URINE: NEGATIVE
BLOOD, URINE: NEGATIVE
CLARITY: CLEAR
COLOR: YELLOW
CRYSTALS, UA: ABNORMAL /HPF
EPITHELIAL CELLS, UA: 2 /HPF (ref 0–5)
GLUCOSE URINE: NEGATIVE MG/DL
HYALINE CASTS: 5 /HPF (ref 0–8)
KETONES, URINE: NEGATIVE MG/DL
LEUKOCYTE ESTERASE, URINE: ABNORMAL
NITRITE, URINE: NEGATIVE
PARATHYROID HORMONE INTACT: 63.6 PG/ML (ref 15–65)
PH UA: 5.5 (ref 5–8)
PROTEIN UA: 30 MG/DL
RBC UA: 3 /HPF (ref 0–4)
SPECIFIC GRAVITY UA: 1.02 (ref 1–1.03)
UROBILINOGEN, URINE: 0.2 E.U./DL
VITAMIN D 25-HYDROXY: 45.7 NG/ML
WBC UA: 7 /HPF (ref 0–5)

## 2022-07-18 ENCOUNTER — TELEPHONE (OUTPATIENT)
Dept: FAMILY MEDICINE CLINIC | Age: 67
End: 2022-07-18

## 2022-07-18 NOTE — TELEPHONE ENCOUNTER
Called patient to let her know that she needs to go to the ER for evaluation. She said she will go very very soon but not today. Patient says she will go first thing in the morning.

## 2022-07-19 ENCOUNTER — HOSPITAL ENCOUNTER (EMERGENCY)
Age: 67
Discharge: HOME | End: 2022-07-19
Attending: EMERGENCY MEDICINE
Payer: MEDICARE

## 2022-07-19 ENCOUNTER — APPOINTMENT (OUTPATIENT)
Dept: GENERAL RADIOLOGY | Age: 67
End: 2022-07-19
Payer: MEDICARE

## 2022-07-19 ENCOUNTER — APPOINTMENT (OUTPATIENT)
Dept: CT IMAGING | Age: 67
End: 2022-07-19
Payer: MEDICARE

## 2022-07-19 VITALS
RESPIRATION RATE: 16 BRPM | TEMPERATURE: 97.9 F | DIASTOLIC BLOOD PRESSURE: 76 MMHG | OXYGEN SATURATION: 95 % | HEART RATE: 72 BPM | WEIGHT: 168 LBS | BODY MASS INDEX: 24.05 KG/M2 | HEIGHT: 70 IN | SYSTOLIC BLOOD PRESSURE: 183 MMHG

## 2022-07-19 DIAGNOSIS — R55 SYNCOPE AND COLLAPSE: Primary | ICD-10-CM

## 2022-07-19 DIAGNOSIS — N39.0 URINARY TRACT INFECTION WITHOUT HEMATURIA, SITE UNSPECIFIED: ICD-10-CM

## 2022-07-19 DIAGNOSIS — E86.0 DEHYDRATION: ICD-10-CM

## 2022-07-19 DIAGNOSIS — G47.00 INSOMNIA, UNSPECIFIED TYPE: ICD-10-CM

## 2022-07-19 LAB
ALBUMIN SERPL-MCNC: 4.7 G/DL (ref 3.5–5.2)
ALP BLD-CCNC: 146 U/L (ref 35–104)
ALT SERPL-CCNC: 21 U/L (ref 5–33)
ANION GAP SERPL CALCULATED.3IONS-SCNC: 10 MMOL/L (ref 7–19)
AST SERPL-CCNC: 23 U/L (ref 5–32)
BACTERIA: NEGATIVE /HPF
BASOPHILS ABSOLUTE: 0.1 K/UL (ref 0–0.2)
BASOPHILS RELATIVE PERCENT: 0.4 % (ref 0–1)
BILIRUB SERPL-MCNC: <0.2 MG/DL (ref 0.2–1.2)
BILIRUBIN URINE: NEGATIVE
BLOOD, URINE: NEGATIVE
BUN BLDV-MCNC: 17 MG/DL (ref 8–23)
CALCIUM SERPL-MCNC: 11.2 MG/DL (ref 8.8–10.2)
CHLORIDE BLD-SCNC: 98 MMOL/L (ref 98–111)
CLARITY: CLEAR
CO2: 27 MMOL/L (ref 22–29)
COLOR: YELLOW
CREAT SERPL-MCNC: 1.2 MG/DL (ref 0.5–0.9)
CRYSTALS, UA: ABNORMAL /HPF
EOSINOPHILS ABSOLUTE: 0.3 K/UL (ref 0–0.6)
EOSINOPHILS RELATIVE PERCENT: 1.9 % (ref 0–5)
EPITHELIAL CELLS, UA: 3 /HPF (ref 0–5)
GFR AFRICAN AMERICAN: 54
GFR NON-AFRICAN AMERICAN: 45
GLUCOSE BLD-MCNC: 87 MG/DL (ref 74–109)
GLUCOSE URINE: NEGATIVE MG/DL
HCT VFR BLD CALC: 44.5 % (ref 37–47)
HEMOGLOBIN: 13.5 G/DL (ref 12–16)
HYALINE CASTS: 2 /HPF (ref 0–8)
IMMATURE GRANULOCYTES #: 0.1 K/UL
KETONES, URINE: NEGATIVE MG/DL
LACTIC ACID: 1 MMOL/L (ref 0.5–1.9)
LEUKOCYTE ESTERASE, URINE: ABNORMAL
LYMPHOCYTES ABSOLUTE: 3.4 K/UL (ref 1.1–4.5)
LYMPHOCYTES RELATIVE PERCENT: 19.9 % (ref 20–40)
MCH RBC QN AUTO: 29.9 PG (ref 27–31)
MCHC RBC AUTO-ENTMCNC: 30.3 G/DL (ref 33–37)
MCV RBC AUTO: 98.7 FL (ref 81–99)
MONOCYTES ABSOLUTE: 0.9 K/UL (ref 0–0.9)
MONOCYTES RELATIVE PERCENT: 5.1 % (ref 0–10)
NEUTROPHILS ABSOLUTE: 12.3 K/UL (ref 1.5–7.5)
NEUTROPHILS RELATIVE PERCENT: 72.3 % (ref 50–65)
NITRITE, URINE: NEGATIVE
PDW BLD-RTO: 13.7 % (ref 11.5–14.5)
PH UA: 6 (ref 5–8)
PLATELET # BLD: 483 K/UL (ref 130–400)
PMV BLD AUTO: 9.9 FL (ref 9.4–12.3)
POTASSIUM REFLEX MAGNESIUM: 4.2 MMOL/L (ref 3.5–5)
PROTEIN UA: ABNORMAL MG/DL
RBC # BLD: 4.51 M/UL (ref 4.2–5.4)
RBC UA: 2 /HPF (ref 0–4)
SARS-COV-2, NAAT: NOT DETECTED
SODIUM BLD-SCNC: 135 MMOL/L (ref 136–145)
SPECIFIC GRAVITY UA: 1.01 (ref 1–1.03)
TOTAL PROTEIN: 8.1 G/DL (ref 6.6–8.7)
TROPONIN: <0.01 NG/ML (ref 0–0.03)
UROBILINOGEN, URINE: 0.2 E.U./DL
WBC # BLD: 17 K/UL (ref 4.8–10.8)
WBC UA: 18 /HPF (ref 0–5)

## 2022-07-19 PROCEDURE — 80053 COMPREHEN METABOLIC PANEL: CPT

## 2022-07-19 PROCEDURE — 2580000003 HC RX 258: Performed by: EMERGENCY MEDICINE

## 2022-07-19 PROCEDURE — 70450 CT HEAD/BRAIN W/O DYE: CPT

## 2022-07-19 PROCEDURE — 81001 URINALYSIS AUTO W/SCOPE: CPT

## 2022-07-19 PROCEDURE — 71045 X-RAY EXAM CHEST 1 VIEW: CPT | Performed by: RADIOLOGY

## 2022-07-19 PROCEDURE — 93005 ELECTROCARDIOGRAM TRACING: CPT | Performed by: EMERGENCY MEDICINE

## 2022-07-19 PROCEDURE — 83605 ASSAY OF LACTIC ACID: CPT

## 2022-07-19 PROCEDURE — 99285 EMERGENCY DEPT VISIT HI MDM: CPT

## 2022-07-19 PROCEDURE — 84484 ASSAY OF TROPONIN QUANT: CPT

## 2022-07-19 PROCEDURE — 96360 HYDRATION IV INFUSION INIT: CPT

## 2022-07-19 PROCEDURE — 87635 SARS-COV-2 COVID-19 AMP PRB: CPT

## 2022-07-19 PROCEDURE — 70450 CT HEAD/BRAIN W/O DYE: CPT | Performed by: RADIOLOGY

## 2022-07-19 PROCEDURE — 85025 COMPLETE CBC W/AUTO DIFF WBC: CPT

## 2022-07-19 PROCEDURE — 87086 URINE CULTURE/COLONY COUNT: CPT

## 2022-07-19 PROCEDURE — 36415 COLL VENOUS BLD VENIPUNCTURE: CPT

## 2022-07-19 PROCEDURE — 71045 X-RAY EXAM CHEST 1 VIEW: CPT

## 2022-07-19 RX ORDER — CEFDINIR 300 MG/1
300 CAPSULE ORAL 2 TIMES DAILY
Qty: 14 CAPSULE | Refills: 0 | Status: SHIPPED | OUTPATIENT
Start: 2022-07-19 | End: 2022-07-26

## 2022-07-19 RX ORDER — ZOLPIDEM TARTRATE 10 MG/1
5 TABLET ORAL NIGHTLY PRN
Qty: 7 TABLET | Refills: 0 | Status: SHIPPED | OUTPATIENT
Start: 2022-07-19 | End: 2022-08-02

## 2022-07-19 RX ORDER — SODIUM CHLORIDE, SODIUM LACTATE, POTASSIUM CHLORIDE, AND CALCIUM CHLORIDE .6; .31; .03; .02 G/100ML; G/100ML; G/100ML; G/100ML
1000 INJECTION, SOLUTION INTRAVENOUS ONCE
Status: COMPLETED | OUTPATIENT
Start: 2022-07-19 | End: 2022-07-19

## 2022-07-19 RX ADMIN — SODIUM CHLORIDE, POTASSIUM CHLORIDE, SODIUM LACTATE AND CALCIUM CHLORIDE 1000 ML: 600; 310; 30; 20 INJECTION, SOLUTION INTRAVENOUS at 11:03

## 2022-07-19 ASSESSMENT — ENCOUNTER SYMPTOMS
SINUS PRESSURE: 0
APNEA: 0
CHOKING: 0
SHORTNESS OF BREATH: 0
ABDOMINAL PAIN: 0
VOICE CHANGE: 0
FACIAL SWELLING: 0
NAUSEA: 0
SORE THROAT: 0
BLOOD IN STOOL: 0
CONSTIPATION: 0
COUGH: 0
DIARRHEA: 0
EYE DISCHARGE: 0

## 2022-07-19 NOTE — ED PROVIDER NOTES
[facility] EMERGENCY DEPT  eMERGENCY dEPARTMENT eNCOUnter      Pt Name: Makenna Savage  MRN: [id number]  [place] 1955  Date of evaluation: 7/19/2022  Provider: Getachew Gibbons MD    [address] [place] [address]       Chief Complaint   Patient presents with    Loss of Consciousness     Multiple times since Fri         HISTORY OF PRESENT ILLNESS   (Location/Symptom, Timing/Onset,Context/Setting, Quality, Duration, Modifying Factors, Severity)  Note limiting factors. Makenna Savage is a 73 y.o. female who presents to the emergency department syncope and insomnia    50-year-old female presents with report of syncope. She denies any neurologic deficits. No chest pain. She can feel it coming on and she will go sit down. She says everything gets wide and starts to spin. She has had problems with syncope off and on for years. She denies any recent infection or fever. No injury. All this is building up to she believes it is from her insomnia. She gives me a bulb bulb area think she is done for her insomnia and how Ambien helped and she used to be on it but her doctor retired. And were talking this was prior to [place]. She also states she is trying to quit smoking and that keeps her up. But she denies any suicidal homicidal ideas does not appear depressed she is not psychotic or delusional.    The history is provided by the patient. NursingNotes were reviewed. REVIEW OF SYSTEMS    (2-9 systems for level 4, 10 or more for level 5)     Review of Systems   Constitutional:  Negative for chills and fever. HENT:  Negative for congestion, drooling, facial swelling, nosebleeds, sinus pressure, sore throat and voice change. Eyes:  Negative for discharge. Respiratory:  Negative for apnea, cough, choking and shortness of breath. Cardiovascular:  Negative for chest pain and leg swelling. Gastrointestinal:  Negative for abdominal pain, blood in stool, constipation, diarrhea and nausea.    Genitourinary:  Negative for dysuria and enuresis. Musculoskeletal:  Positive for myalgias. Negative for joint swelling. Skin:  Negative for rash and wound. Neurological:  Positive for syncope and light-headedness. Negative for seizures. Psychiatric/Behavioral:  Negative for behavioral problems, hallucinations and suicidal ideas. All other systems reviewed and are negative. A complete review of systems was performed and is negative except as noted above in the HPI. PAST MEDICAL HISTORY     Past Medical History:   Diagnosis Date    Deafness     deaf in right ear 40% in left ear    Endometrial ca ([address] 9/2015    Insomnia     Left breast mass     had to have compression view and u/s    Scoliosis 1986         SURGICAL HISTORY       Past Surgical History:   Procedure Laterality Date    COLONOSCOPY  2015    HYSTERECTOMY (CERVIX STATUS UNKNOWN)  10/2016     [place]     INNER EAR SURGERY      TONSILLECTOMY AND ADENOIDECTOMY      TUBAL LIGATION  1980         CURRENT MEDICATIONS       Previous Medications    ACETAMINOPHEN (TYLENOL) 325 MG TABLET    Take 650 mg by mouth every 6 hours as needed for Pain    BRIMONIDINE (ALPHAGAN) 0.2 % OPHTHALMIC SOLUTION    INSTILL 1 DROP INTO LEFT EYE TWICE DAILY    DORZOLAMIDE-TIMOLOL (COSOPT) 22.3-6.8 MG/ML OPHTHALMIC SOLUTION    INSTILL 1 DROP INTO EACH EYE TWICE DAILY    HYDROXYZINE HCL (ATARAX) 25 MG TABLET    Take 1 tablet by mouth nightly    LUMIGAN 0.01 % SOLN OPHTHALMIC DROPS    INSTILL 1 DROP INTO EACH EYE IN THE EVENING       ALLERGIES     Patient has no known allergies.     FAMILY HISTORY       Family History   Problem Relation Age of Onset    Colon Cancer Neg Hx     Colon Polyps Neg Hx           SOCIAL HISTORY       Social History     Socioeconomic History    Marital status:      Spouse name: None    Number of children: None    Years of education: None    Highest education level: None   Tobacco Use    Smoking status: Former     Packs/day: 0.25     Years: 2.00     Pack years: 0.50 Types: Cigarettes     Start date:      Quit date: 2011     Years since quittin.5    Smokeless tobacco: Former   Substance and Sexual Activity    Alcohol use: No    Drug use: No       SCREENINGS    [place] Coma Scale  Eye Opening: Spontaneous  Best Verbal Response: Oriented  Best Motor Response: Obeys commands  Am[place] Coma Scale Score: 15        PHYSICAL EXAM    (up to 7 for level 4, 8 or more for level 5)     ED Triage Vitals [22 0948]   BP Temp Temp Source Heart Rate Resp SpO2 Height Weight   (!) 184/86 97.9 °F (36.6 °C) Oral 73 19 95 % 5' 10\" (1.778 m) 168 lb (76.2 kg)       Physical Exam  Vitals and nursing note reviewed. Constitutional:       General: She is not in acute distress. Appearance: She is well-developed. She is not ill-appearing. HENT:      Head: Normocephalic and atraumatic. Right Ear: Ear canal and external ear normal.      Left Ear: Ear canal and external ear normal.      Nose: Nose normal.      Mouth/Throat:      Mouth: Mucous membranes are moist.      Pharynx: Oropharynx is clear. Eyes:      General: No scleral icterus. Conjunctiva/sclera: Conjunctivae normal.      Pupils: Pupils are equal, round, and reactive to light. Cardiovascular:      Rate and Rhythm: Normal rate and regular rhythm. Pulses: Normal pulses. Heart sounds: Normal heart sounds. No murmur heard. Pulmonary:      Effort: Pulmonary effort is normal.      Breath sounds: Normal breath sounds. Abdominal:      General: Bowel sounds are normal.      Palpations: Abdomen is soft. Tenderness: There is no abdominal tenderness. Musculoskeletal:         General: No signs of injury. Normal range of motion. Cervical back: Normal range of motion and neck supple. Skin:     General: Skin is warm and dry. Coloration: Skin is not jaundiced or pale. Neurological:      General: No focal deficit present.       Mental Status: She is alert and oriented to person, place, and time.   Psychiatric:         Mood and Affect: Mood normal.         Behavior: Behavior normal.       DIAGNOSTIC RESULTS     EKG: All EKG's are interpreted by the Emergency Department Physician who either signs or Co-signs this chart in the absence of a cardiologist.    Sinus rhythm rate 70 ID interval 149. QTc 427. No ST abnormality. RADIOLOGY:   Non-plain film images such as CT, Ultrasound and MRI are read by the radiologist. Clenton Carmelo images are visualized and preliminarily interpreted by the emergency physician with the below findings:    Results    Interpretation per the Radiologist below, if available at the time of this note:    XR CHEST PORTABLE   Final Result   Dorsolumbar scoliosis. Chest otherwise unremarkable. Recommendation: Follow up as clinically indicated. Electronically Signed by Mary Lou Martinez MD at 19-Jul-2022 11:48:05 AM               CT Head WO Contrast   Final Result   Unremarkable CT of the brain with no acute intracranial abnormality. Recommendation: Follow up as clinically indicated. All CT scans at this facility utilize dose modulation, iterative reconstruction, and/or weight based dosing when appropriate to reduce radiation dose to as low as reasonably achievable.     Electronically Signed by Tom Herring MD at 19-Jul-2022 12:01:19 PM                     ED BEDSIDE ULTRASOUND:   Performed by ED Physician - none    LABS:  Labs Reviewed   CBC WITH AUTO DIFFERENTIAL - Abnormal; Notable for the following components:       Result Value    WBC 17.0 (*)     MCHC 30.3 (*)     Platelets 698 (*)     Neutrophils % 72.3 (*)     Lymphocytes % 19.9 (*)     Neutrophils Absolute 12.3 (*)     All other components within normal limits   COMPREHENSIVE METABOLIC PANEL W/ REFLEX TO MG FOR LOW K - Abnormal; Notable for the following components:    Sodium 135 (*)     CREATININE 1.2 (*)     GFR Non- 45 (*)     GFR  54 (*)     Calcium 11.2 (*)     Alkaline Phosphatase 146 (*)     All other components within normal limits   URINALYSIS WITH REFLEX TO CULTURE - Abnormal; Notable for the following components:    Protein, UA TRACE (*)     Leukocyte Esterase, Urine SMALL (*)     All other components within normal limits   MICROSCOPIC URINALYSIS - Abnormal; Notable for the following components:    Bacteria, UA NEGATIVE (*)     Crystals, UA NEG (*)     WBC, UA 18 (*)     All other components within normal limits   COVID-19, RAPID   CULTURE, URINE   TROPONIN   LACTIC ACID       All other labs were within normal range or not returned as of this dictation. EMERGENCY DEPARTMENT COURSE and DIFFERENTIALDIAGNOSIS/MDM:   Vitals:    Vitals:    07/19/22 0948 07/19/22 1000   BP: (!) 184/86 (!) 183/76   Pulse: 73 72   Resp: 19 16   Temp: 97.9 °F (36.6 °C)    TempSrc: Oral    SpO2: 95% 95%   Weight: 168 lb (76.2 kg)    Height: 5' 10\" (1.778 m)        MDM  Number of Diagnoses or Management Options  Diagnosis management comments: Patient remained stable no orthostasis. Creatinine slightly up given her IV fluids. She does not want to stay she wants to go home. We discussed risks and benefits and follow-up. She believes it is because of her insomnia. So I agreed to give her 7 Ambien tablets and she needs to follow-up with her clinician if she wants to continue that. I advised her that does not help or she gets worse to return. There was some pyuria and she had an elevated white count. So I will cover this I did not find any other source of infection or why her white count would be. CONSULTS:  None    PROCEDURES:  Unless otherwise notedbelow, none     Procedures    FINAL IMPRESSION     1. Syncope and collapse    2. Urinary tract infection without hematuria, site unspecified    3. Dehydration    4.  Insomnia, unspecified type          DISPOSITION/PLAN   DISPOSITION Decision To Discharge 07/19/2022 01:05:29 PM      PATIENT REFERRED TO:  @FUP@    DISCHARGE MEDICATIONS:  New Prescriptions    CEFDINIR (OMNICEF) 300 MG CAPSULE    Take 1 capsule by mouth in the morning and 1 capsule before bedtime. Do all this for 7 days. ZOLPIDEM (AMBIEN) 10 MG TABLET    Take 0.5 tablets by mouth nightly as needed for Sleep for up to 14 days.           (Please note that portions of this note were completed with a voice recognition program.  Efforts were made to edit the dictations butoccasionally words are mis-transcribed.)    Jenifer Martin MD (electronically signed)  AttendingEmergency Physician          Milo Preston MD  07/19/22 1933

## 2022-07-20 LAB
EKG P AXIS: 67 DEGREES
EKG P-R INTERVAL: 144 MS
EKG Q-T INTERVAL: 382 MS
EKG QRS DURATION: 76 MS
EKG QTC CALCULATION (BAZETT): 399 MS
EKG T AXIS: 61 DEGREES

## 2022-07-20 PROCEDURE — 93010 ELECTROCARDIOGRAM REPORT: CPT | Performed by: INTERNAL MEDICINE

## 2022-07-21 LAB — URINE CULTURE, ROUTINE: NORMAL

## 2022-08-12 PROBLEM — R00.1 SINUS BRADYCARDIA: Status: ACTIVE | Noted: 2022-01-01

## 2022-08-12 PROBLEM — G93.40 ACUTE ENCEPHALOPATHY: Status: ACTIVE | Noted: 2022-01-01

## 2022-08-12 PROBLEM — Z72.0 TOBACCO ABUSE: Status: ACTIVE | Noted: 2022-01-01

## 2022-08-12 PROBLEM — R41.82 ALTERED MENTAL STATUS: Status: ACTIVE | Noted: 2022-01-01

## 2022-08-12 NOTE — PLAN OF CARE
Goal Outcome Evaluation:  Plan of Care Reviewed With: patient, caregiver (JASPER Walker)        Progress: no change (Initial Evaluation)       SPEECH-LANGUAGE PATHOLOGY EVALUATION - SWALLOW  Subjective: The patient was seen on this date for a Clinical Swallow evaluation.  Patient was alert and cooperative. Patient's speech was slurred but intelligible. Slight right labial droop is observed.   Primary problem: MRI with no acute findings, presented with R facial droop, dysarthria.   Medical history: Uterine cancer, deafness L ear, glaucoma L eye, IBS, pain R hip.  Objective: Textures given included ice, thin liquid, nectar thick liquid, honey thick liquid, puree consistency, and regular consistency.  Assessment: Difficulties were noted with none of the above consistencies.  Observations: No overt s/s of aspiration. Functional rotary chew with solids.   SLP Findings:  Patient presents with functional swallow, without esophageal component.   Recommendations: Diet Textures: thin liquid, regular consistency food.  Medications should be taken whole with thin liquids.   Recommended Strategies: Upright for PO and small bites and sips. Oral care before breakfast, after all meals and PRN.  Other Recommended Evaluations: Speech-Language Evaluation if not back to baseline.   Dysphagia therapy is not recommended. Rationale: Baseline.

## 2022-08-12 NOTE — THERAPY EVALUATION
Acute Care - Speech Language Pathology   Swallow Initial Evaluation [facility]     Patient Name: Digna Russell  : 1955  MRN: [id number]  Today's Date: 2022               Admit Date: 2022    SPEECH-LANGUAGE PATHOLOGY EVALUATION - SWALLOW  Subjective: The patient was seen on this date for a Clinical Swallow evaluation.  Patient was alert and cooperative. Patient's speech was slurred but intelligible. Slight right labial droop is observed.   Primary problem: MRI with no acute findings, presented with R facial droop, dysarthria.   Medical history: Uterine cancer, deafness L ear, glaucoma L eye, IBS, pain R hip.  Objective: Textures given included ice, thin liquid, nectar thick liquid, honey thick liquid, puree consistency, and regular consistency.  Assessment: Difficulties were noted with none of the above consistencies.  Observations: No overt s/s of aspiration. Functional rotary chew with solids.   SLP Findings:  Patient presents with functional swallow, without esophageal component.   Recommendations: Diet Textures: thin liquid, regular consistency food.  Medications should be taken whole with thin liquids.   Recommended Strategies: Upright for PO and small bites and sips. Oral care before breakfast, after all meals and PRN.  Other Recommended Evaluations: Speech-Language Evaluation if not back to baseline.   Dysphagia therapy is not recommended. Rationale: Baseline.  Tyra Mccoy MS CCC-SLP 2022 14:14 CDT    Visit Dx:     ICD-10-CM ICD-9-CM   1. Dysphagia, unspecified type  R13.10 787.20     Patient Active Problem List   Diagnosis   • Adenocarcinoma of endometrium, stage 2 (HCC)   • Anxiety   • Change in bowel habits   • Deafness   • Insomnia   • Scoliosis   • Altered mental status     Past Medical History:   Diagnosis Date   • Cancer (HCC)     uterine   • Deafness in left ear    • Glaucoma, left eye    • IBS (irritable bowel syndrome)    • Pain in right hip    • Wears hearing aid in  right ear      Past Surgical History:   Procedure Laterality Date   • HYSTERECTOMY     • STEROID INJECTION Right 4/21/2022    Procedure: FLUOROSCOPIC GUIDED RIGHT HIP AND TROCHANTERIC BURSA INJECTION;  Surgeon: Ronaldo Calles MD;  Location: [facility];  Service: Orthopedics;  Laterality: Right;   • TONSILLECTOMY AND ADENOIDECTOMY     • TUBAL ABDOMINAL LIGATION Bilateral        SLP Recommendation and Plan  SLP Swallowing Diagnosis: swallow WFL (08/12/22 1314)  SLP Diet Recommendation: regular textures, thin liquids (08/12/22 1314)  Recommended Precautions and Strategies: upright posture during/after eating, small bites of food and sips of liquid, general aspiration precautions, fatigue precautions (08/12/22 1314)  SLP Rec. for Method of Medication Administration: meds whole, as tolerated (08/12/22 1314)     Monitor for Signs of Aspiration: yes, notify SLP if any concerns (08/12/22 1314)  Recommended Diagnostics: SLE/Cog/Motor Speech Evaluation (08/12/22 1314)  Swallow Criteria for Skilled Therapeutic Interventions Met: no problems identified which require skilled intervention (08/12/22 1314)  Anticipated Discharge Disposition (SLP): unknown (08/12/22 1314)     Therapy Frequency (Swallow): evaluation only (08/12/22 1314)  Predicted Duration Therapy Intervention (Days): until discharge (08/12/22 1314)                                  Plan of Care Reviewed With: patient, caregiver (JASPER Walker)  Progress: no change (Initial Evaluation)      SWALLOW EVALUATION (last 72 hours)     SLP Adult Swallow Evaluation     Row Name 08/12/22 1314                   Rehab Evaluation    Document Type evaluation  -MM        Subjective Information no complaints  -MM        Patient Observations alert;cooperative;agree to therapy  -MM        Patient/Family/Caregiver Comments/Observations No family present.  -MM        Patient Effort good  -MM        Symptoms Noted During/After Treatment none  -MM                  General Information    Patient  Profile Reviewed yes  -MM        Pertinent History Of Current Problem Primary problem: MRI with no acute findings, presented with R facial droop, dysarthria. Medical history: Uterine cancer, deafness L ear, glaucoma L eye, IBS, pain R hip.  -MM        Current Method of Nutrition NPO  -MM        Precautions/Limitations, Vision vision impairment, left  -MM        Precautions/Limitations, Hearing hearing impairment, left  -MM        Prior Level of Function-Communication WFL  -MM        Prior Level of Function-Swallowing no diet consistency restrictions  -MM        Plans/Goals Discussed with patient;other (see comments);agreed upon  RN Aaron  -MM        Barriers to Rehab none identified  -MM        Patient's Goals for Discharge patient did not state  -MM                  Pain    Additional Documentation Pain Scale: FACES Pre/Post-Treatment (Group)  -MM                  Pain Scale: FACES Pre/Post-Treatment    Pain: FACES Scale, Pretreatment 0-->no hurt  -MM        Posttreatment Pain Rating 0-->no hurt  -MM                  Oral Motor Structure and Function    Dentition Assessment natural, present and adequate  -MM        Secretion Management WNL/WFL  -MM        Mucosal Quality moist, healthy  -MM        Volitional Swallow WFL  -MM        Volitional Cough WFL  -MM                  Oral Musculature and Cranial Nerve Assessment    Oral Motor General Assessment oral labial or buccal impairment;lingual impairment  -MM        Oral Labial or Buccal Impairment, Detail, Cranial Nerve VII (Facial): CN7: Motor Impairment;right labial droop  -MM        Lingual Impairment, Detail. Cranial Nerves IX, XII (Glossopharyngeal and Hypoglossal) CN12: Motor Impairment  -MM                  General Eating/Swallowing Observations    Eating/Swallowing Skills fed by SLP  -MM        Positioning During Eating upright in bed  -MM        Utensils Used spoon;straw  -MM        Consistencies Trialed pureed;honey-thick liquids;nectar/syrup-thick  liquids;thin liquids;regular textures;ice chips  -MM                  Clinical Swallow Eval    Oral Prep Phase WFL  -MM        Oral Transit WFL  -MM        Oral Residue WFL  -MM        Pharyngeal Phase no overt signs/symptoms of pharyngeal impairment  -MM        Esophageal Phase unremarkable  -MM        Clinical Swallow Evaluation Summary See note  -MM                  SLP Evaluation Clinical Impression    SLP Swallowing Diagnosis swallow WFL  -MM        Functional Impact no impact on function  -MM        Swallow Criteria for Skilled Therapeutic Interventions Met no problems identified which require skilled intervention  -MM                  Recommendations    Therapy Frequency (Swallow) evaluation only  -MM        Predicted Duration Therapy Intervention (Days) until discharge  -MM        SLP Diet Recommendation regular textures;thin liquids  -MM        Recommended Diagnostics SLE/Cog/Motor Speech Evaluation  -MM        Recommended Precautions and Strategies upright posture during/after eating;small bites of food and sips of liquid;general aspiration precautions;fatigue precautions  -MM        Oral Care Recommendations Oral Care BID/PRN;Toothbrush  -MM        SLP Rec. for Method of Medication Administration meds whole;as tolerated  -MM        Monitor for Signs of Aspiration yes;notify SLP if any concerns  -MM        Anticipated Discharge Disposition (SLP) unknown  -MM              User Key  (r) = Recorded By, (t) = Taken By, (c) = Cosigned By    Initials Name Effective Dates    MM Tyra Mccoy, MS CCC-SLP 06/16/21 -                 EDUCATION  The patient has been educated in the following areas:   Dysphagia (Swallowing Impairment) Oral Care/Hydration.              Time Calculation:    Time Calculation- SLP     Row Name 08/12/22 1413             Time Calculation- SLP    SLP Start Time 1314  -MM      SLP Stop Time 1413  -MM      SLP Time Calculation (min) 59 min  -MM      SLP Received On 08/12/22  -               Untimed Charges    SLP Eval/Re-eval  ST Eval Oral Pharyng Swallow - 82626  -MM      [id number] Eval Oral Pharyng Swallow Minutes 59  -MM              Total Minutes    Untimed Charges Total Minutes 59  -MM       Total Minutes 59  -MM            User Key  (r) = Recorded By, (t) = Taken By, (c) = Cosigned By    Initials Name Provider Type    Tyra Reed, MS CCC-SLP Speech and Language Pathologist                Therapy Charges for Today     Code Description Service Date Service Provider Modifiers Qty    [id number]  ST EVAL ORAL PHARYNG SWALLOW 4 8/12/2022 Tyra Mccoy MS KATHLEEN-SLP GN 1               Tyra Mccoy MS CCC-DIANA  8/12/2022

## 2022-08-12 NOTE — ED NOTES
Pt to ED via EMS from home with co dizziness upon awakening this am. Pt reports taking 3 benadryl last night in at attempt to help her sleep. Last known well apx 2130 last night.  equal, no tongue deviation noted. No focal deficits noted. Generalized bilateral lower ext weakness noted. Pt able to follow commands, but appears weak.

## 2022-08-12 NOTE — CONSULTS
Neurology Consult Note    Patient:  Digna Russell   YOB: 1955  MRN:  [id number]  Date of Admission:  8/12/2022  7:52 AM    Date: 8/12/2022    Referring Provider: Bhupinder Keenan MD  Reason for Consultation: Stroke      History of present illness:     This is a 66 y.o. 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  From the best I can tell today she had trouble sleeping and had been up since 3 AM.  She went to get her cell phone drink from the living room to go to the kitchen but did not quite make it all the way there before she noted the room spinning and she turned around and went back to her chair.  She sat down and called 911.  They had asked her at the door was unlocked and and apparently she was able to get up and walk to the door and sit outside.  She states that she had to hold onto things so that she would not fall.  She did call her sister.  She has dysarthria currently having arrived quite confused and per nursing she was not able to lift her legs as well she can now but especially her right leg    She tells me she has had these problems with the room spinning at least since 2019.  And she did she states that it typically occurs when she has not slept well.  Her first event was after her eye problems in 2019 when she had the left branch retinal  vein occlusion with macular edema and was found to have bilateral glaucoma.  She has had poor vision in her left eye since and receives injections into her left eye.     She had called Dr. Rock's office after the first event of near syncope and apparently he saw her within the next 2 days and gave her 5 months of Ambien.  Is unclear if there is any other work-up that has been done.  She then returned 6 months later and he gave her another 5 months of Ambien.  Then COVID hit so she did not want to go to his office anymore.  After COVID was resolved she he had retired and so she went to his nurse practitioner.  She does not know what she was given but she did not like the medication so she stopped going to that nurse practitioner.     In December 2021 1 she saw and Juanita Deepak but this time was for hip pain.  She noted some numbness and pain going down her leg but actually what she points out to me was that the lateral femoral cutaneous nerve and she talked about the pain as well as the numbness but she ultimately did have a hip pain and was seen by Dr. Ronaldo Tracey in April 2022 and had an intra-articular corticosteroid injection in the right hip and trochanteric bursa.    She reports that she has a long history of anemia but that she will take the iron she broke the pill down and smashed it and used a magnet and the pill pieces clung to the magnet so she has not been up with that in her body    She also reports that she has had elevated white blood counts in the past.    H/O adenocarcinoma of endomerium  s/p hysterectomy  Past Medical History:   Diagnosis Date   • Cancer (HCC)     uterine   • Deafness in left ear    • Glaucoma, left eye    • IBS (irritable bowel syndrome)    • Pain in right hip    • Wears hearing aid in right ear        Past Surgical History:   Procedure Laterality Date   • HYSTERECTOMY     • STEROID INJECTION Right 4/21/2022    Procedure: FLUOROSCOPIC GUIDED RIGHT HIP AND TROCHANTERIC BURSA  INJECTION;  Surgeon: Ronaldo Calles MD;  Location: [facility];  Service: Orthopedics;  Laterality: Right;   • TONSILLECTOMY AND ADENOIDECTOMY     • TUBAL ABDOMINAL LIGATION Bilateral        Prior to Admission medications    Medication Sig Start Date End Date Taking? Authorizing Provider   acetaminophen (TYLENOL) 325 MG tablet Take 650 mg by mouth Every 4 (Four) Hours As Needed for Mild Pain  or Moderate Pain .    ProviderJulisa MD   Diclofenac Sodium (Voltaren) 1 % gel gel Apply 4 g topically to the appropriate area as directed 4 (Four) Times a Day As Needed (leg pain). 1/28/22   Jose Ramon Balderrama PA-C   dorzolamide-timolol (COSOPT) 22.3-6.8 MG/ML ophthalmic solution Administer 1 drop to both eyes Daily. 6/9/21   Julisa Muller MD   Lumigan 0.01 % ophthalmic drops Administer 1 drop to both eyes Every Night. 6/24/21   Julisa Muller MD   Rhopressa 0.02 % solution Administer 1 drop into the left eye Every Night. 11/19/21   Julisa Muller MD   tiZANidine (Zanaflex) 4 MG tablet Take 1 tablet by mouth Every 6 (Six) Hours As Needed for Muscle Spasms. 1/28/22   Jose Ramon Balderrama PA-C   Triprolidine-Pseudoephedrine (ANTIHISTAMINE PO) Take 3 tablets by mouth Every Night. Pt states takes an over the counter [employer] brand antihistamine/allergy pill nightly to make her sleep.    ProviderJulisa MD       Hospital scheduled medications:   lactated ringers, 1,000 mL, Intravenous, Once      Hospital PRN medications:  sodium chloride    No Known Allergies    Social History     Socioeconomic History   • Marital status:    Tobacco Use   • Smoking status: Current Some Day Smoker     Years: 20.00     Types: Cigarettes   • Smokeless tobacco: Never Used   • Tobacco comment: pt is a very sporadic smoker. smokes on and off. but states did smoke today (4/7/22)   Vaping Use   • Vaping Use: Never used   Substance and Sexual Activity   • Alcohol use: Never   • Drug use: Never   • Sexual activity: Not  Currently     Birth control/protection: Surgical     History reviewed. No pertinent family history.    Review of Systems  A 14-point review of systems was reviewed and was negative except for dizzy spells, near syncope/syncope, and difficulty walking    Vital Signs   Temp:  [98.2 °F (36.8 °C)] 98.2 °F (36.8 °C)  Heart Rate:  [50-57] 50  Resp:  [16] 16  BP: (162-198)/(67-85) 169/79    General Exam:  Head:  Normocephalic, atraumatic  HEENT:  Neck supple  Fundoscopic Exam:  No signs of disc edema  CVS:  Regular rate and rhythm.  No murmurs  Carotid Examination:  No bruits  Lungs:  Clear to auscultation  Abdomen:  Nontender, nondistended  Extremities:  No signs of peripheral edema  Skin:  No rashes    Neurologic Exam:    Mental Status:    -Somnolent but awakens easily.  She would not answer orientation questions  -No word finding difficulties  -No aphasia  -Has dysarthria  -Follows simple  Commands but the whole time is tell me why she cannot do things and then she will do them    CN II:  Visual fields full and right eye.  Pupils equally reactive to light bilaterally.  She has light perception in her left eye and she can also see my hand away but she could not count fingers in the left eye  CN III, IV, VI:  Extraocular Muscles are not conjugate likely due to the loss decreased vision of the left eye but movement is full full with no signs of nystagmus  CN V:  Facial sensory is symmetric   CN VII:  Facial motor symmetric when she smiles but she had a slight right facial droop  CN VIII:  Hearing intact on the right to finger rub and on the left is absent to finger rub  CN IX/X:  Palate elevates symmetrically  CN XI:  Shoulder shrug symmetric  CN XII:  Tongue is midline on protrusion    Motor: (strength out of 5:  1= minimal movement, 2 = movement in plane of gravity, 3 = movement against gravity, 4 = movement against some resistance, 5 = full strength)    -Right Upper Ext: Proximal: 3 Distal: 5  -Left Upper Ext: Proximal:  5 Distal: 5    -Right Lower Ext: Proximal: 5 Distal: 5  -Left Lower Ext: Proximal: 5 Distal: 5    DTR:  -Right   Biceps: 2+ Triceps: 2+ Brachioradialis: 2+   Patella: 2+ Ankle: 2+ Neg Babinski  -Left   Biceps: 2+ Triceps: 2+ Brachioradialis: 2+   Patella: 2+ Ankle: 2+ Neg Babinski    Sensory:  -I tried about 4 times testing sensory on her and each time she kept focusing on when she had numbness in her right lateral thigh     Coordination:  -Finger to nose intact  -Heel to shin intact on the left but she had difficulty performing this on the right  -No ataxia    Gait  -Not tested for safety reasons as she is telling me her gait is impaired      Results Review:  Lab Results (last 7 days)     Procedure Component Value Units Date/Time    COVID-19,[facility] IN-HOUSE,Nasal Swab No Transport Media 3-4 HR TAT - Swab, Nasal Cavity [[id number]]  (Normal) Collected: 08/12/22 0902    Specimen: Swab from Nasal Cavity Updated: 08/12/22 0955     COVID19 Not Detected    Narrative:      Fact sheet for providers: https://www.fda.gov/media/181090/download     Fact sheet for patients: https://www.fda.gov/media/282615/download    Test performed by PCR.    Consider negative results in combination with clinical observations, patient history, and epidemiological information.  Fact sheet for providers: https://www.fda.gov/media/545312/download     Fact sheet for patients: https://www.fda.gov/media/840530/download    Test performed by PCR.    Consider negative results in combination with clinical observations, patient history, and epidemiological information.    Urine Drug Screen - Urine, Clean Catch [[id number]]  (Normal) Collected: 08/12/22 0856    Specimen: Urine, Clean Catch Updated: 08/12/22 0918     THC, Screen, Urine Negative     Phencyclidine (PCP), Urine Negative     Cocaine Screen, Urine Negative     Methamphetamine, Ur Negative     Opiate Screen Negative     Amphetamine Screen, Urine Negative     Benzodiazepine Screen, Urine Negative      Tricyclic Antidepressants Screen Negative     Methadone Screen, Urine Negative     Barbiturates Screen, Urine Negative     Oxycodone Screen, Urine Negative     Propoxyphene Screen Negative     Buprenorphine, Screen, Urine Negative    Narrative:      Cutoff For Drugs Screened:    Amphetamines               500 ng/ml  Barbiturates               200 ng/ml  Benzodiazepines            150 ng/ml  Cocaine                    150 ng/ml  Methadone                  200 ng/ml  Opiates                    100 ng/ml  Phencyclidine               25 ng/ml  THC                            50 ng/ml  Methamphetamine            500 ng/ml  Tricyclic Antidepressants  300 ng/ml  Oxycodone                  100 ng/ml  Propoxyphene               300 ng/ml  Buprenorphine               10 ng/ml    The normal value for all drugs tested is negative. This report includes unconfirmed screening results, with the cutoff values listed, to be used for medical treatment purposes only.  Unconfirmed results must not be used for non-medical purposes such as employment or legal testing.  Clinical consideration should be applied to any drug of abuse test, particularly when unconfirmed results are used.      Urinalysis With Culture If Indicated - Urine, Clean Catch [[id number]]  (Abnormal) Collected: 08/12/22 0856    Specimen: Urine, Clean Catch Updated: 08/12/22 0912     Color, UA Yellow     Appearance, UA Clear     pH, UA 8.0     Specific Gravity, UA 1.008     Glucose, UA Negative     Ketones, UA Negative     Bilirubin, UA Negative     Blood, UA Negative     Protein, UA Trace     Leuk Esterase, UA Negative     Nitrite, UA Negative     Urobilinogen, UA 0.2 E.U./dL    Narrative:      In absence of clinical symptoms, the presence of pyuria, bacteria, and/or nitrites on the urinalysis result does not correlate with infection.  Urine microscopic not indicated.    [place] Draw [[id number]] Collected: 08/12/22 0756    Specimen: Blood Updated: 08/12/22 0903     Narrative:      The following orders were created for panel order [place] Draw.  Procedure                               Abnormality         Status                     ---------                               -----------         ------                     Green Top (Gel)[[id number]]                                  Final result               Lavender Top[[id number]]                                     Final result               Red Top[[id number]]                                          Final result               Cervantes Top[[id number]]                                         In process                 Light Blue Top[[id number]]                                   Final result                 Please view results for these tests on the individual orders.    Green Top (Gel) [[id number]] Collected: 08/12/22 0756    Specimen: Blood Updated: 08/12/22 0903     Extra Tube Hold for add-ons.     Comment: Auto resulted.       Red Top [[id number]] Collected: 08/12/22 0756    Specimen: Blood Updated: 08/12/22 0903     Extra Tube Hold for add-ons.     Comment: Auto resulted.       Light Blue Top [[id number]] Collected: 08/12/22 0756    Specimen: Blood Updated: 08/12/22 0903     Extra Tube Hold for add-ons.     Comment: Auto resulted       Lavender Top [[id number]] Collected: 08/12/22 0756    Specimen: Blood Updated: 08/12/22 0903     Extra Tube hold for add-on     Comment: Auto resulted       Ammonia [[id number]]  (Normal) Collected: 08/12/22 0829    Specimen: Blood Updated: 08/12/22 0858     Ammonia 20 umol/L     Blood Gas, Venous - [[id number]] Collected: 08/12/22 0835    Specimen: Venous Blood Updated: 08/12/22 0834     Site OTHER     pH, Venous 7.385 pH Units      pCO2, Venous 42.8 mm Hg      pO2, Venous 34.4 mm Hg      HCO3, Venous 25.6 mmol/L      Base Excess, Venous 0.4 mmol/L      O2 Saturation, Venous 67.4 %      Temperature 37.0 C      Barometric Pressure for Blood Gas 754 mmHg      Modality Room Air     Ventilator Mode NA      Collected by [id number]     Comment: Meter: [id number]     :  [id number]       Salicylate Level [[id number]]  (Normal) Collected: 08/12/22 0756    Specimen: Blood Updated: 08/12/22 0833     Salicylate <0.3 mg/dL     Comprehensive Metabolic Panel [[id number]]  (Abnormal) Collected: 08/12/22 0756    Specimen: Blood Updated: 08/12/22 0833     Glucose 194 mg/dL      BUN 21 mg/dL      Creatinine 1.36 mg/dL      Sodium 131 mmol/L      Potassium 4.1 mmol/L      Chloride 95 mmol/L      CO2 26.0 mmol/L      Calcium 10.2 mg/dL      Total Protein 7.0 g/dL      Albumin 4.00 g/dL      ALT (SGPT) 7 U/L      AST (SGOT) 15 U/L      Alkaline Phosphatase 108 U/L      Total Bilirubin 0.4 mg/dL      Globulin 3.0 gm/dL      A/G Ratio 1.3 g/dL      BUN/Creatinine Ratio 15.4     Anion Gap 10.0 mmol/L      eGFR 43.3 mL/min/1.73      Comment: National Kidney Foundation and American Society of Nephrology (ASN) Task Force recommended calculation based on the Chronic Kidney Disease Epidemiology Collaboration (CKD-EPI) equation refit without adjustment for race.       Narrative:      GFR Normal >60  Chronic Kidney Disease <60  Kidney Failure <15      Acetaminophen Level [[id number]]  (Normal) Collected: 08/12/22 0756    Specimen: Blood Updated: 08/12/22 0832     Acetaminophen 15.4 mcg/mL     Ethanol [[id number]] Collected: 08/12/22 0756    Specimen: Blood Updated: 08/12/22 0828     Ethanol % <0.010 %     Narrative:      Not for legal purposes. Chain of Custody not followed.     Protime-INR [[id number]]  (Normal) Collected: 08/12/22 0756    Specimen: Blood Updated: 08/12/22 0825     Protime 13.1 Seconds      INR 1.03    CBC & Differential [[id number]]  (Abnormal) Collected: 08/12/22 0756    Specimen: Blood Updated: 08/12/22 0820    Narrative:      The following orders were created for panel order CBC & Differential.  Procedure                               Abnormality         Status                     ---------                                -----------         ------                     CBC Auto Differential[[id number]]        Abnormal            Final result                 Please view results for these tests on the individual orders.    CBC Auto Differential [[id number]]  (Abnormal) Collected: 08/12/22 0756    Specimen: Blood Updated: 08/12/22 0820     WBC 18.56 10*3/mm3      RBC 3.75 10*6/mm3      Hemoglobin 11.0 g/dL      Hematocrit 36.2 %      MCV 96.5 fL      MCH 29.3 pg      MCHC 30.4 g/dL      RDW 12.9 %      RDW-SD 46.3 fl      MPV 10.0 fL      Platelets 447 10*3/mm3      Neutrophil % 80.2 %      Lymphocyte % 13.2 %      Monocyte % 5.1 %      Eosinophil % 0.6 %      Basophil % 0.3 %      Immature Grans % 0.6 %      Neutrophils, Absolute 14.88 10*3/mm3      Lymphocytes, Absolute 2.45 10*3/mm3      Monocytes, Absolute 0.95 10*3/mm3      Eosinophils, Absolute 0.11 10*3/mm3      Basophils, Absolute 0.05 10*3/mm3      Immature Grans, Absolute 0.12 10*3/mm3      nRBC 0.0 /100 WBC     Cervantes Top [[id number]] Collected: 08/12/22 0756    Specimen: Blood Updated: 08/12/22 0801          .  Imaging Results (Last 24 Hours)     Procedure Component Value Units Date/Time    CT Angiogram Neck [[id number]] Collected: 08/12/22 0953     Updated: 08/12/22 0959    Narrative:      EXAMINATION: CT ANGIOGRAM NECK- 8/12/2022 9:53 AM CDT     HISTORY: Stroke, follow up. Dizziness.     DOSE: 171 mGycm (Automatic exposure control technique was implemented in  an effort to keep the radiation dose as low as possible without  compromising image quality)     REPORT: Spiral CT of the neck was performed after administration of  intravenous contrast using CTA protocol, which includes reconstructed  coronal, sagittal and 3-D images.     COMPARISON: There are no correlative imaging studies for comparison.     The visualized lungs demonstrate a small area of reticulonodular. In the  right upper lobe posteriorly axial images through L4 through to 8. No  lung consolidation is identified.  There is mild subpleural scarring at  the apices. Caliber of the thoracic aortic arch appears normal. The  thyroid gland is within normal limits. The right common carotid artery  is patent, normal caliber, without evidence of dissection. There is  minimal uncalcified plaque within the origin of the right ICA, with less  than 50% stenosis. The right ICA and ECA are patent.     The left common carotid artery is patent, normal caliber, without  evidence of dissection. There is no significant plaque at the left  carotid bifurcation, with 0% stenosis. The left ICA and ECA are patent.  Both vertebral arteries are patent, left vertebral artery is dominant  compared to the right. No evidence of vertebral dissection is  identified. There is poor visualization of the right PICA. Please review  today's CTA head for additional findings. Soft tissue windows show no  soft tissue neck mass or evidence of lymphadenopathy. The airway is  patent. Review of bone windows shows no acute abnormality.       Impression:      1. No carotid or vertebral occlusion or dissection is identified. There  is minimal noncalcified plaque within the origin of the right ICA, less  than 50% stenosis. Poor visualization of the right PICA as described on  today's CTA head. For additional findings intracranially, please review  the CTA head report.        This report was finalized on 08/12/2022 09:56 by Dr. Adama Mckeon MD.    CT Angiogram Head w AI Analysis of LVO [[id number]] Collected: 08/12/22 0948     Updated: 08/12/22 0956    Narrative:      EXAMINATION: CT ANGIOGRAM HEAD W AI ANALYSIS OF LVO- 8/12/2022 9:41 AM  CDT     HISTORY: Acute Stroke, dizziness.     DOSE: 171 mGycm (Automatic exposure control technique was implemented in  an effort to keep the radiation dose as low as possible without  compromising image quality)     REPORT: Spiral CT of the head was performed after administration of  intravenous contrast using CTA protocol, which includes  reconstructed  coronal, sagittal and 3-D images.     COMPARISON: CT head without contrast on the same day.     The visualized distal internal carotid arteries are patent, normal  caliber bilaterally. The middle and anterior cerebral arteries are  normally patent. Left vertebral artery is dominant compared to the  right, both vertebral arteries are patent. The left PICA is patent,  there is poor visualization of the right PICA, there is some motion  artifact. The basilar artery is patent, there is attenuation of the left  posterior cerebral artery beginning approximately 5 mm from its origin  and a small thrombus cannot be excluded, distal to this level the vessel  appears small and tenuous. No intracranial aneurysm is identified.       Impression:      1. There is attenuation of the left posterior cerebral artery beginning  approximately 5 mm from its origin and a small thrombus is not excluded.  There is poor visualization of the right PICA.  2. Normal patency of the middle and anterior femur arteries, normal  patency of the vertebral arteries and basilar artery. The right PCA  appears patent.     REPORT called to Dr. Keenan in the emergency department as code  stroke protocol 0952 hours 8/12/2022.     This report was finalized on 08/12/2022 09:52 by Dr. Adama Mckeon MD.    CT Head Without Contrast Stroke Protocol [[id number]] Collected: 08/12/22 0938     Updated: 08/12/22 0943    Narrative:      EXAMINATION: CT HEAD WO CONTRAST STROKE PROTOCOL- 8/12/2022 9:38 AM CDT     HISTORY: Stroke, follow up. Dizziness, loss of consciousness.     DOSE: 591 mGycm (Automatic exposure control technique was implemented in  an effort to keep the radiation dose as low as possible without  compromising image quality)     REPORT:     Spiral CT of the head was performed without intravenous contrast.  Reconstructed coronal and sagittal images are also reviewed.     Comparison: MRI brain without contrast 9/27/2012.     No evidence  of intracranial hemorrhage, mass or mass-effect is  identified. The ventricles and basal cisterns appear within normal  limits. Bone windows show no skull fracture. There is chronic bilateral  mastoid sclerosis as before.       Impression:      Impression: No acute intracranial abnormality.            This report was finalized on 08/12/2022 09:40 by Dr. Adama Mckeon MD.    XR Chest 1 View [[id number]] Collected: 08/12/22 0828     Updated: 08/12/22 0832    Narrative:      EXAMINATION: XR CHEST 1 VW- 8/12/2022 8:28 AM CDT     HISTORY: Stroke Protocol (Onset > 12 hrs).     REPORT: A frontal view of the chest was obtained.     COMPARISON: There are no correlative imaging studies for comparison.     The lungs are mildly hypoaerated. No infiltrate is identified. There is  skinfold artifact over the right chest and no pneumothorax is  identified. There is no pleural effusion. The osseous structures and  upper abdomen are unremarkable.       Impression:      Somewhat shallow inspiration, no acute cardiopulmonary  abnormality.  This report was finalized on 08/12/2022 08:29 by Dr. Adama Mckeon MD.              Impression    1.  Possible strokelike symptoms with mild right facial droop that but with a symmetric smile, dysarthria, and difficulty moving the right leg initially and now just from the hip flexors and also difficulty trying to do heel-to-shin testing using her right leg.  2. Self-report of recurrent syncope.  She reports about 50 times of passing out since 2019.  Typically is preceded by feeling dizzy and she describes the dizziness as the room spinning.  She states it could be t difficult hat she fell asleep because it always happens after she has not slept but she believes she has passed out when it was not due to sleep  3. 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 .  Visual impairment from the left eye due to central vein  occlusion with macular edema on a background of bilateral coma--she states all of it is due to glaucoma but I reviewed the note from her ophthalmologist dated 11/20/2021  5. .  Hearing loss in the left ear she does not hear the finger rub and normally wears a hearing aid her records  6..  History of hyperlipidemia  7..  Leukocytosis which she states she has had in the past  8  Anemia which may be iron deficient anemia is unclear because she states her doctor gave her iron but she would not take it  9.  History of insomnia  10.  Hyponatremia  11. Hypertension for which she was given labetalol in the ED     Plan  Is not at all clear that she is absolutely had a stroke given the variability in the exam.  But there does seem to be some deficits including the dysarthria and subtle right facial droop.  And nursing did note some problems with the right leg initially.She also is a difficult historian as it is unclear what her current issues are but I tried to glean those as best as I could in the history.     In any event she will need an evaluation for recurrent syncope which would include the following  MRI of the brain  without contra  Lipid panel  Hemoglobin A1c  Echo with bubble  Orthostase patient  TSH  Iron profile, B12, folate  SCDs  Cardiac monitor  Consider EEG --will try and reach family    I discussed the patient's findings and my recommendations with patient, nursing staff and Dr Shlomo Walker MD  08/12/22  10:03 CDT

## 2022-08-12 NOTE — H&P
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  Correlation made with previous CT scan of the abdomen obtained earlier today.  The diffusion-weighted imaging sequence including ADC map show no areas of restricted diffusion.  There is no evidence of a mass. No midline shift.  The ventricles, basal cisterns and cortical sulci are normal.  The orbits bilaterally are normal. The optic nerves, optic chiasm and optic tract are normal.  2 2 glands are normal. The corpus callosum, the brainstem and cerebellum are normal.  The FLAIR sequence images show a few foci of abnormal T2 signal in the periventricular and deep subcortical white matter representing areas of chronic ischemia due to small vessel disease.  The intracranial major flow voids is maintained in gradient recall imaging sequence.  The limited visualized paranasal sinuses are normal. A left mastoid effusion.      Impression: 1. No acute intracranial abnormality, particularly, no evidence of acute infarction or a mass. This report was finalized on 08/12/2022 12:10 by Dr. Gilberto Deng MD.            Echo with bubble study  Results for orders placed during the hospital encounter of 08/12/22    Adult Transthoracic Echo Complete W/ Cont if Necessary Per Protocol    Interpretation Summary  · Estimated  right ventricular systolic pressure from tricuspid regurgitation is normal (<35 mmHg).  · Saline test results are negative.  · Left ventricular wall thickness is consistent with mild concentric hypertrophy.  · Estimated left ventricular EF = 75% Left ventricular systolic function is hyperdynamic (EF > 70%).  · Left ventricular diastolic function is consistent with (grade I) impaired relaxation.       Lab Results   Component Value Date    HGBA1C 5.70 (H) 08/12/2022    LDL 90 08/13/2022    HDL 33 (L) 08/13/2022         PT/OT/SLP    VTE PPx with SCDs      Code Status/Advanced Care Plan: Full    The patient's surrogate decision maker is Keila Ortega .     I discussed my findings and recommendations with the patient, sister, bedside RN, neurology.    Estimated length of stay is 2-4 days.     The patient was seen and examined by me on 8/12/2022 at 1040.    Electronically signed by Raman Goyal MD, 08/12/22, 10:40 CDT.

## 2022-08-12 NOTE — ED PROVIDER NOTES
Subjective   History of Present Illness   Patient presents with altered mental status.  She states that this morning she woke up and felt very weak and fatigued so she called 911.  She is able to ambulate in her home but after calling no one got up to the front steps and sat down and waited on the ambulance.  She took 3 Benadryl at 9:30 PM last night as she usually does and went to bed.  She has some difficulty with speech which she says is not typical for her.  She denies any falls, traumas, or blood thinner use.  She says that she can hear what I am saying but finds it very difficult to respond even though she knows that I am talking to her.  Denies drug or other medication use.  Was feeling normal when she went to bed with no symptoms.      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    • Glaucoma, left eye    • IBS (irritable bowel syndrome)    • Pain in right hip    • Wears hearing aid in right ear        No Known Allergies    Past Surgical History:   Procedure Laterality Date   • HYSTERECTOMY     • STEROID INJECTION Right 4/21/2022    Procedure: FLUOROSCOPIC GUIDED RIGHT HIP AND TROCHANTERIC BURSA INJECTION;  Surgeon: Ronaldo Calles MD;  Location: [facility];  Service: Orthopedics;  Laterality: Right;   • TONSILLECTOMY AND ADENOIDECTOMY     • TUBAL ABDOMINAL LIGATION Bilateral        Family History   Problem Relation Age of Onset   • Stroke Father    • Stroke Brother        Social History     Socioeconomic History   • Marital status:    Tobacco Use   • Smoking status: Current Every Day Smoker     Years: 20.00     Types: Cigarettes   • Smokeless tobacco: Never Used   Vaping Use   • Vaping Use: Never used   Substance and Sexual Activity   • Alcohol use: Never   • Drug use: Never   • Sexual activity: Not Currently     Birth control/protection: Surgical           Objective   Physical Exam  Vitals reviewed.   Constitutional:       General:  She is not in acute distress.  HENT:      Head: Normocephalic and atraumatic.   Eyes:      Extraocular Movements: Extraocular movements intact.      Conjunctiva/sclera: Conjunctivae normal.      Comments: L eye with some lateral gaze deviation, unclear if baseline, pt attributes to glaucoma   Cardiovascular:      Pulses: Normal pulses.      Heart sounds: Normal heart sounds.   Pulmonary:      Effort: Pulmonary effort is normal. No respiratory distress.   Abdominal:      General: Abdomen is flat. There is no distension.   Musculoskeletal:      Cervical back: Normal range of motion and neck supple.   Skin:     General: Skin is warm and dry.   Neurological:      Mental Status: She is alert.      Comments: Right upper extremity: 5/5 strength with handgrip and flexion/extension of shoulders, elbows.   Light touch sensation intact and equal when compared to the left upper extremity.  Left upper extremity: 5/5 strength with handgrip and flexion/extension of shoulders, elbows.   Light touch sensation intact and equal when compared to the right upper extremity.  Right lower extremity: Drift within 5 second window  Light touch sensation intact and equal when compared to the left lower extremity.  Left lower extremity: Drift within 5 second window  Light touch sensation intact and equal when compared to the right lower extremity.    Light sensation intact in bilateral face. CN 2-12 normal. FNF normal. Unable to stand and ambulate    Speech is slurred  Not aphasic   Psychiatric:         Behavior: Behavior normal.         Thought Content: Thought content normal.         Procedures           ED Course                                           MDM   Digna Russell is a 66 y.o. female with PMH above who presents to the Emergency Department with altered mental status.  Exam is concerning for slurred speech; NIH stroke scale technically calculated at 4 although the patient's bilateral lower extremity drift may be attributed to  generalized weakness and is nonfocal.  Will obtain stroke work-up; patient outside of 4-1/2-hour window.  Also hypertensive without a reported history of hypertension so we will give antihypertensive medication and reassess.  She did take Benadryl and does have a dry mouth but otherwise does not have a presentation overall consistent with Benadryl intoxication and it has been 12 hours since she took the Benadryl.  We will obtain coingestion labs to evaluate for other ingestion.  Also considered intracranial hemorrhage, metabolic process such as hyperammonemia, hypercapnia, or acidosis.     ED Course:   -Hypertensive to 200s systolic; BP responded to 5mg labetalol IV x1  -EKG reviewed by me; shows sinus rhythm, no focal ischemic changes, no arrhythmia.  -Labs show leukocytosis, anemia compared to 4mo ago. VBG wnl, ammonia wnl, no anion gap.  CTA concerning for PCA occlusion. Neurology paged. No ICH.  -Patient was evaluated by neurology and they recommend MRI.  Given that she required additional blood pressure management and that her symptoms might not be entirely explained by a PCA occlusion she was admitted to the hospitalist    All questions answered. Patient/family was understanding and in agreement with today's assessment and plan. The patient was monitored during their stay in the ED and dispositioned without acute event.    Electronically signed by:  Bhupinder Keenan MD 8/14/2022 11:29 CDT      Note: Dragon medical dictation software was used in the creation of this note.        Final diagnoses:   Dysphagia, unspecified type   Impaired mobility and ADLs       ED Disposition  ED Disposition     ED Disposition   Decision to Admit    Condition   --    Comment   Level of Care: Critical Care [6]   Diagnosis: Altered mental status [780.97.ICD-9-CM]   Admitting Physician: SERGIO VICTOR [[id number]]   Attending Physician: SERGIO VICTOR [[id number]]   Certification: I Certify That Inpatient Hospital Services Are  Medically Necessary For Greater Than 2 Midnights               No follow-up provider specified.       Medication List      No changes were made to your prescriptions during this visit.          Bhupinder Keenan MD  08/14/22 5437

## 2022-08-13 PROBLEM — I16.1 HYPERTENSIVE EMERGENCY: Status: ACTIVE | Noted: 2022-01-01

## 2022-08-13 NOTE — PROGRESS NOTES
"  Neurology Progress Note      Chief Complaint:  encpehlopathy    Subjective     Subjective:    Laying in bed this morning with no acute distress.  She immediately tells me that she cannot go home.  She tells me that she is falling at home and having black out spells.  She also complains of chronic right leg pain from \"varicose veins\" that limit her ability to ambulate.  She goes further to say that she has lost weight due to her inability some days to walk to the kitchen and fix herself a meal.      Medications:  Current Facility-Administered Medications   Medication Dose Route Frequency Provider Last Rate Last Admin   • acetaminophen (TYLENOL) tablet 650 mg  650 mg Oral Q4H PRN Raman Goyal MD   650 mg at 08/13/22 0842    Or   • acetaminophen (TYLENOL) 160 MG/5ML solution 650 mg  650 mg Oral Q4H PRN Raman Goyal MD        Or   • acetaminophen (TYLENOL) suppository 650 mg  650 mg Rectal Q4H PRN Raman Goyal MD       • aspirin chewable tablet 81 mg  81 mg Oral Daily Sherly Seymour MD   81 mg at 08/13/22 0834   • atorvastatin (LIPITOR) tablet 80 mg  80 mg Oral Nightly Raman Goyal MD   80 mg at 08/12/22 2202   • Camphor-Menthol-Methyl Sal 4-10-30 % cream 1 application  1 application Apply externally 4x Daily PRN Jean Carlos Miller MD   1 application at 08/13/22 0036   • Enoxaparin Sodium (LOVENOX) syringe 40 mg  40 mg Subcutaneous Q24H Raman Goyal MD   40 mg at 08/12/22 1358   • melatonin tablet 6 mg  6 mg Oral Nightly Raman Goyal MD       • ondansetron (ZOFRAN) tablet 4 mg  4 mg Oral Q6H PRN Raman Goyal MD        Or   • ondansetron (ZOFRAN) injection 4 mg  4 mg Intravenous Q6H PRN Raman Goyal MD       • QUEtiapine (SEROquel) tablet 25 mg  25 mg Oral Nightly Raman Goyal MD   25 mg at 08/13/22 0036   • sodium chloride 0.9 % flush 10 mL  10 mL Intravenous Q12H Raman Goyal MD   10 mL at 08/13/22 0868   • sodium chloride 0.9 % " flush 10 mL  10 mL Intravenous PRN Raman Goyal MD       • sodium chloride 0.9 % infusion  50 mL/hr Intravenous Continuous Raman Goyal MD 50 mL/hr at 08/12/22 1523 50 mL/hr at 08/12/22 1523       Review of Systems:   -A 14 point review of systems is completed and is negative except for mild confusion      Objective      Vital Signs  Temp:  [95.3 °F (35.2 °C)-97.7 °F (36.5 °C)] 97.5 °F (36.4 °C)  Heart Rate:  [35-59] 59  Resp:  [16] 16  BP: (104-191)/() 166/66    Physical Exam:    HEENT:  neck is supple  CVS:  RRR  Lungs:  CTA - B/L  Abd:  NT/ND  Ext:  no edema  Skin:  no rashes    Awake.  Follows commands.  Knows year.  Knows location.  Doesn't know president.  Tangential speech.  No dysarthria    CN II - XII intact with no obvious deficits    MTR:  Moving all extremities.  Seems to have some antalgic weaknss of her RLE    DTR: no pathologic reflexes    Coord/Gait:  No obvious ataxia     Results Review:    I reviewed the patient's new clinical results.    Results from last 7 days   Lab Units 08/12/22  0756   WBC 10*3/mm3 18.56*   HEMOGLOBIN g/dL 11.0*   HEMATOCRIT % 36.2   PLATELETS 10*3/mm3 447        Results from last 7 days   Lab Units 08/12/22  0756   SODIUM mmol/L 131*   POTASSIUM mmol/L 4.1   CHLORIDE mmol/L 95*   CO2 mmol/L 26.0   BUN mg/dL 21   CREATININE mg/dL 1.36*   CALCIUM mg/dL 10.2   BILIRUBIN mg/dL 0.4   ALK PHOS U/L 108   ALT (SGPT) U/L 7   AST (SGOT) U/L 15   GLUCOSE mg/dL 194*        Lab Results   Component Value Date    PHOS 2.9 08/12/2022    MG 2.1 08/12/2022    PROTIME 13.1 08/12/2022    INR 1.03 08/12/2022     No components found for: POCGLUC  No components found for: A1C  Lab Results   Component Value Date    HDL 33 (L) 08/13/2022    LDL 90 08/13/2022     No components found for: B12  Lab Results   Component Value Date    TSH 1.060 08/12/2022     MRI Brain:  No acute disease.  No sig atrophy    CTA head and neck:  No sig vascular disease.  Left PCA does not fill completely  - may be chronic    TTE:  75% EF.  No other findings    LDL:  142  TSH:  1.06  Ammonia:  20    Bradycardia overnight (pink line above)        Assessment/Plan     Hospital Problem List      Acute encephalopathy    Adenocarcinoma of endometrium, stage 2 (HCC)    Insomnia disorder, exacerbated by nocturnal nicotine use     Tobacco abuse    Sinus bradycardia    Impression:  · Strokelike symptoms with her MRI being negative.  This may have represented a TIA; however, this may have also represented a hypertensive emergency. (197/80 on presentation to the ER)  · Self-reported syncope likely secondary to severe bradycardia seen overnight  · Mild hyperlipidemia  · Leukocytosis  · Bradycardia  · Mild hyponatremia.  · Not safe to be discharged home alone secondary to frequent falls      Plan:  · Continue working with physical, occupational, and speech therapy  · Recommend low-dose aspirin and high-dose statin  · Discharge planning recommended for skilled nursing facility  · Bradycardia treatment per primary team        Harris Ruff MD  08/13/22  09:46 CDT

## 2022-08-13 NOTE — CASE MANAGEMENT/SOCIAL WORK
Discharge Planning Assessment  [facility]     Patient Name: Digna Russell  MRN: [id number]  Today's Date: 8/13/2022    Admit Date: 8/12/2022     Discharge Needs Assessment     Row Name 08/13/22 1528       Living Environment    People in Home alone    Current Living Arrangements home    Primary Care Provided by self    Provides Primary Care For no one    Family Caregiver if Needed sibling(s)    Quality of Family Relationships helpful;involved;supportive    Able to Return to Prior Arrangements no       Resource/Environmental Concerns    Resource/Environmental Concerns none       Transition Planning    Patient/Family Anticipates Transition to inpatient rehabilitation facility    Patient/Family Anticipated Services at Transition skilled nursing    Transportation Anticipated family or friend will provide       Discharge Needs Assessment    Readmission Within the Last 30 Days no previous admission in last 30 days    Concerns to be Addressed no discharge needs identified    Anticipated Changes Related to Illness none    Equipment Needed After Discharge none    Current Discharge Risk chronically ill;dependent with mobility/activities of daily living    Discharge Coordination/Progress Pt resides alone.  Pt has PCP, RX coverage and can afford medications.  SW spoke to pt about rehab options and she has requested to be placed at [facility].  SW has informed admissions and will follow for bed offer/denial.               Discharge Plan     Row Name 08/13/22 1525       Plan    Patient/Family in Agreement with Plan unable to assess              Continued Care and Services - Admitted Since 8/12/2022    Coordination has not been started for this encounter.       Expected Discharge Date and Time     Expected Discharge Date Expected Discharge Time    Aug 14, 2022          Demographic Summary    No documentation.                Functional Status    No documentation.                Psychosocial    No documentation.                 Abuse/Neglect    No documentation.                Legal    No documentation.                Substance Abuse    No documentation.                Patient Forms    No documentation.                   MAYITO HurdW

## 2022-08-13 NOTE — PLAN OF CARE
Goal Outcome Evaluation:        Up with assist to bsc without difficulty.  Speak remained dysarthric and rambling at times.  Eating and swallowing without problem.

## 2022-08-13 NOTE — PLAN OF CARE
Goal Outcome Evaluation:  Plan of Care Reviewed With: patient, sibling        Progress: improving  Outcome Evaluation: Pt transferred from CCU at 1400 via wheelchair, NIH 2, AOx4, no complaints of pain, assessment completed, oriented to unit, call system and plan of care. safety maintained, sister notified of room change via phone.

## 2022-08-14 NOTE — PLAN OF CARE
Goal Outcome Evaluation:  Plan of Care Reviewed With: patient        Progress: no change  Outcome Evaluation: OT IE completed. Pt in fowlers upon therapist arrival; A&Ox4; Reported 9/10 pain in R hip. Pt reports I with all ADLs/IADLs at OF. Pt performed supine<>sit with SBA utilizing bedrails. Pt donned socks bilaterally while seated with SBA. Pt donned brief while sitting/standing as appropriate requiring CGA. Pt performed sit<>stand utilizing no AD with CGA. Pt ambulated from bed>BR and BR>recliner utilizing no AD with CGA. Pt performed sit<>stand toilet transfer utilizing grab bar with CGA. BUE shoulder flex/ext 4-/5; BUE strength grossly 4/5 at all other joints in all other planes. Educated Pt on safety/fall precautions; Pt verbalized understanding. Pt would benefit from skilled OT intervention in order to address deficits in fxl mobility, fxl activity tolerance, balance, coordination, and strength which currently prevents the Pt from performing daily activities safely at [facility]. Recommend home with assist and HH at discharge.

## 2022-08-14 NOTE — THERAPY EVALUATION
Patient Name: Digna Russell  : 1955    MRN: [id number]                              Today's Date: 2022       Admit Date: 2022    Visit Dx:     ICD-10-CM ICD-9-CM   1. Dysphagia, unspecified type  R13.10 787.20   2. Impaired mobility and ADLs  Z74.09 V49.89    Z78.9      Patient Active Problem List   Diagnosis   • Adenocarcinoma of endometrium, stage 2 (HCC) status post hysterectomy   • Anxiety   • Change in bowel habits   • Deafness   • Insomnia disorder, exacerbated by nocturnal nicotine use    • Scoliosis   • Tobacco abuse   • Acute encephalopathy   • Sinus bradycardia   • Hypertensive emergency with possible hypertensive encephalopathy     Past Medical History:   Diagnosis Date   • Cancer (HCC)     uterine   • Deafness in left ear    • Glaucoma, left eye    • IBS (irritable bowel syndrome)    • Pain in right hip    • Wears hearing aid in right ear      Past Surgical History:   Procedure Laterality Date   • HYSTERECTOMY     • STEROID INJECTION Right 2022    Procedure: FLUOROSCOPIC GUIDED RIGHT HIP AND TROCHANTERIC BURSA INJECTION;  Surgeon: Ronaldo Calles MD;  Location: [facility];  Service: Orthopedics;  Laterality: Right;   • TONSILLECTOMY AND ADENOIDECTOMY     • TUBAL ABDOMINAL LIGATION Bilateral       General Information     Row Name 2243          OT Time and Intention    Document Type evaluation  Presented to ED due to confusion and AMS. Dx: Acute encephalopathy, Possible left PCA thrombus on CTA neck.  -LS     Mode of Treatment occupational therapy  -LS     Row Name 2243          General Information    Patient Profile Reviewed yes  -LS     Prior Level of Function independent:;all household mobility;community mobility;ADL's;home management;cooking;cleaning;driving;shopping  -LS     Existing Precautions/Restrictions fall  R hip/knee pain  -LS     Row Name 22 0943          Occupational Profile    Environmental Supports and Barriers (Occupational Profile) Pt lives  alone in a single level home with 2 steps onto platform followed by 2 steps into house with no handrails. Pt has a tub shower combination with 1 suction cup grab bar and no shower chair. Pt has no grab bars next to toilet. Pt owns a RW and several SCs, but she does not utilize them. Pt sleeps in a regular bed.  -     Row Name 08/14/22 0943          Living Environment    People in Home alone  -     Row Name 08/14/22 0943          Home Main Entrance    Number of Stairs, Main Entrance four  -LS     Stair Railings, Main Entrance none  -     Row Name 08/14/22 0943          Stairs Within Home, Primary    Number of Stairs, Within Home, Primary none  -     Row Name 08/14/22 0943          Cognition    Orientation Status (Cognition) oriented x 4  -     Row Name 08/14/22 0943          Safety Issues, Functional Mobility    Impairments Affecting Function (Mobility) balance;cognition;endurance/activity tolerance;pain;coordination;strength  -     Cognitive Impairments, Mobility Safety/Performance attention;safety precaution awareness;safety precaution follow-through  -           User Key  (r) = Recorded By, (t) = Taken By, (c) = Cosigned By    Initials Name Provider Type     Katina Salas, OTR/L Occupational Therapist                 Mobility/ADL's     Row Name 08/14/22 0943          Bed Mobility    Bed Mobility bed mobility (all) activities  -     All Activities, [place] (Bed Mobility) standby assist  -     Assistive Device (Bed Mobility) bed rails  -     Row Name 08/14/22 0943          Transfers    Transfers sit-stand transfer;stand-sit transfer  -     Sit-Stand [place] (Transfers) contact guard  -     Stand-Sit [place] (Transfers) contact guard  -     Row Name 08/14/22 0943          Functional Mobility    Functional Mobility- Ind. Level contact guard assist  Pt ambulated from bed>BR and BR>recliner utilizing no AD with CGA.  -     Row Name 08/14/22 0943          Activities of Daily  Living    BADL Assessment/Intervention upper body dressing;lower body dressing;toileting  -     Row Name 08/14/22 0943          Upper Body Dressing Assessment/Training    [place] Level (Upper Body Dressing) upper body dressing skills;set up  -     Position (Upper Body Dressing) edge of bed sitting  -     Row Name 08/14/22 0943          Lower Body Dressing Assessment/Training    [place] Level (Lower Body Dressing) lower body dressing skills  -     Position (Lower Body Dressing) edge of bed sitting;unsupported standing  -     Row Name 08/14/22 0943          Toileting Assessment/Training    [place] Level (Toileting) toileting skills;contact guard assist  -     Position (Toileting) unsupported sitting;unsupported standing  -           User Key  (r) = Recorded By, (t) = Taken By, (c) = Cosigned By    Initials Name Provider Type     Katina Salas OTR/L Occupational Therapist               Obj/Interventions     Row Name 08/14/22 0943          Sensory Assessment (Somatosensory)    Sensory Assessment (Somatosensory) UE sensation intact  -[facility] Name 08/14/22 0943          Vision Assessment/Intervention    Visual Impairment/Limitations WFL;corrective lenses full-time  -     Row Name 08/14/22 0943          Range of Motion Comprehensive    General Range of Motion bilateral upper extremity ROM WFL  -[facility] Name 08/14/22 0943          Strength Comprehensive (MMT)    General Manual Muscle Testing (MMT) Assessment upper extremity strength deficits identified  -     Comment, General Manual Muscle Testing (MMT) Assessment BUE shoulder flex/ext 4-/5; BUE strength grossly 4/5 at all other joints in all other planes.  -     Row Name 08/14/22 0943          Balance    Balance Assessment sitting static balance;sitting dynamic balance;standing static balance;standing dynamic balance  -     Static Sitting Balance modified independence  -     Dynamic Sitting Balance standby assist  -      Static Standing Balance contact guard  -LS     Dynamic Standing Balance contact guard  -LS           User Key  (r) = Recorded By, (t) = Taken By, (c) = Cosigned By    Initials Name Provider Type    Katina Allen OTR/L Occupational Therapist               Goals/Plan     Row Name 08/14/22 0943          Transfer Goal 1 (OT)    Activity/Assistive Device (Transfer Goal 1, OT) toilet  -LS     [place] Level/Cues Needed (Transfer Goal 1, OT) modified independence  -LS     Time Frame (Transfer Goal 1, OT) long term goal (LTG);10 days  -LS     Progress/Outcome (Transfer Goal 1, OT) new goal  -     Row Name 08/14/22 0943          Bathing Goal 1 (OT)    Activity/Device (Bathing Goal 1, OT) bathing skills, all  -LS     [place] Level/Cues Needed (Bathing Goal 1, OT) modified independence  -LS     Time Frame (Bathing Goal 1, OT) long term goal (LTG);10 days  -LS     Progress/Outcomes (Bathing Goal 1, OT) new goal  -     Row Name 08/14/22 0943          Dressing Goal 1 (OT)    Activity/Device (Dressing Goal 1, OT) dressing skills, all  -LS     [place]/Cues Needed (Dressing Goal 1, OT) modified independence  -LS     Time Frame (Dressing Goal 1, OT) long term goal (LTG);10 days  -LS     Progress/Outcome (Dressing Goal 1, OT) new goal  -     Row Name 08/14/22 0943          Toileting Goal 1 (OT)    Activity/Device (Toileting Goal 1, OT) toileting skills, all  -LS     [place] Level/Cues Needed (Toileting Goal 1, OT) modified independence  -LS     Time Frame (Toileting Goal 1, OT) long term goal (LTG);10 days  -LS     Progress/Outcome (Toileting Goal 1, OT) new goal  -     Row Name 08/14/22 0943          Therapy Assessment/Plan (OT)    Planned Therapy Interventions (OT) activity tolerance training;functional balance retraining;occupation/activity based interventions;ROM/therapeutic exercise;strengthening exercise;transfer/mobility retraining;patient/caregiver education/training;adaptive equipment  training;BADL retraining  -           User Key  (r) = Recorded By, (t) = Taken By, (c) = Cosigned By    Initials Name Provider Type    Katina Allen, OTR/L Occupational Therapist               Clinical Impression     Row Name 08/14/22 0943          Pain Assessment    Pretreatment Pain Rating 9/10  -LS     Posttreatment Pain Rating 7/10  -LS     Pain Location - Side/Orientation Right  -LS     Pain Location - hip;knee  -LS     Pain Intervention(s) Repositioned;Medication (See MAR)  -     Row Name 08/14/22 0943          Plan of Care Review    Plan of Care Reviewed With patient  -LS     Progress no change  -     Outcome Evaluation OT IE completed. Pt in fowlers upon therapist arrival; A&Ox4; Reported 9/10 pain in R hip. Pt reports I with all ADLs/IADLs at OF. Pt performed supine<>sit with SBA utilizing bedrails. Pt donned socks bilaterally while seated with SBA. Pt donned brief while sitting/standing as appropriate requiring CGA. Pt performed sit<>stand utilizing no AD with CGA. Pt ambulated from bed>BR and BR>recliner utilizing no AD with CGA. Pt performed sit<>stand toilet transfer utilizing grab bar with CGA. BUE shoulder flex/ext 4-/5; BUE strength grossly 4/5 at all other joints in all other planes. Educated Pt on safety/fall precautions; Pt verbalized understanding. Pt would benefit from skilled OT intervention in order to address deficits in fxl mobility, fxl activity tolerance, balance, coordination, and strength which currently prevents the Pt from performing daily activities safely at [facility]. Recommend home with assist and HH at discharge.  -     Row Name 08/14/22 0943          Therapy Assessment/Plan (OT)    Rehab Potential (OT) good, to achieve stated therapy goals  -     Criteria for Skilled Therapeutic Interventions Met (OT) yes;skilled treatment is necessary  -     Therapy Frequency (OT) 5 times/wk  -     Row Name 08/14/22 0943          Therapy Plan Review/Discharge Plan (OT)     Anticipated Discharge Disposition (OT) home with assist;home with home health  -     Row Name 08/14/22 0943          Positioning and Restraints    Pre-Treatment Position in bed  -LS     Post Treatment Position chair  -LS     In Chair sitting;call light within reach;encouraged to call for assist;exit alarm on  -LS           User Key  (r) = Recorded By, (t) = Taken By, (c) = Cosigned By    Initials Name Provider Type    Katina Allen OTR/L Occupational Therapist               Outcome Measures     Row Name 08/14/22 0943          How much help from another is currently needed...    Putting on and taking off regular lower body clothing? 3  -LS     Bathing (including washing, rinsing, and drying) 3  -LS     Toileting (which includes using toilet bed pan or urinal) 3  -LS     Putting on and taking off regular upper body clothing 3  -LS     Taking care of personal grooming (such as brushing teeth) 4  -LS     Eating meals 4  -LS     AM-PAC 6 Clicks Score (OT) 20  -     Row Name 08/14/22 0943          Functional Assessment    Outcome Measure Options AM-PAC 6 Clicks Daily Activity (OT)  -LS           User Key  (r) = Recorded By, (t) = Taken By, (c) = Cosigned By    Initials Name Provider Type    Katina Allen OTR/L Occupational Therapist                Occupational Therapy Education                 Title: PT OT SLP Therapies (In Progress)     Topic: Occupational Therapy (In Progress)     Point: ADL training (Done)     Description:   Instruct learner(s) on proper safety adaptation and remediation techniques during self care or transfers.   Instruct in proper use of assistive devices.              Learning Progress Summary           Patient Acceptance, E, VU,NR by  at 8/14/2022 1110                   Point: Home exercise program (Not Started)     Description:   Instruct learner(s) on appropriate technique for monitoring, assisting and/or progressing therapeutic exercises/activities.              Learner Progress:   Not documented in this visit.          Point: Precautions (Done)     Description:   Instruct learner(s) on prescribed precautions during self-care and functional transfers.              Learning Progress Summary           Patient Acceptance, E, VU,NR by YULIA at 8/14/2022 1110                   Point: Body mechanics (Not Started)     Description:   Instruct learner(s) on proper positioning and spine alignment during self-care, functional mobility activities and/or exercises.              Learner Progress:  Not documented in this visit.                      User Key     Initials Effective Dates Name Provider Type Discipline    YULIA 06/20/22 -  Katina Salas, OTR/L Occupational Therapist OT              OT Recommendation and Plan  Planned Therapy Interventions (OT): activity tolerance training, functional balance retraining, occupation/activity based interventions, ROM/therapeutic exercise, strengthening exercise, transfer/mobility retraining, patient/caregiver education/training, adaptive equipment training, BADL retraining  Therapy Frequency (OT): 5 times/wk  Plan of Care Review  Plan of Care Reviewed With: patient  Progress: no change  Outcome Evaluation: OT IE completed. Pt in fowlers upon therapist arrival; A&Ox4; Reported 9/10 pain in R hip. Pt reports I with all ADLs/IADLs at OF. Pt performed supine<>sit with SBA utilizing bedrails. Pt donned socks bilaterally while seated with SBA. Pt donned brief while sitting/standing as appropriate requiring CGA. Pt performed sit<>stand utilizing no AD with CGA. Pt ambulated from bed>BR and BR>recliner utilizing no AD with CGA. Pt performed sit<>stand toilet transfer utilizing grab bar with CGA. BUE shoulder flex/ext 4-/5; BUE strength grossly 4/5 at all other joints in all other planes. Educated Pt on safety/fall precautions; Pt verbalized understanding. Pt would benefit from skilled OT intervention in order to address deficits in fxl mobility, fxl activity tolerance,  balance, coordination, and strength which currently prevents the Pt from performing daily activities safely at [facility]. Recommend home with assist and HH at discharge.     Time Calculation:    Time Calculation- OT     Row Name 08/14/22 0943             Time Calculation- OT    OT Start Time 0943  +10 minutes chart review  -LS      OT Stop Time 1027  -LS      OT Time Calculation (min) 44 min  -      OT Non-Billable Time (min) 54 min  -      OT Received On 08/14/22  -      OT Goal Re-Cert Due Date 08/24/22  -            User Key  (r) = Recorded By, (t) = Taken By, (c) = Cosigned By    Initials Name Provider Type    LS Katina Salas, OTR/L Occupational Therapist              Therapy Charges for Today     Code Description Service Date Service Provider Modifiers Qty    [id number] HC OT EVAL MOD COMPLEXITY 4 8/14/2022 Katina Salas, OTR/L GO 1               Katina Salas OTR/L  8/14/2022

## 2022-08-14 NOTE — PROGRESS NOTES
[facility] Medicine Services  Smoking Cessation      Digna Russell is a 66 y.o. yo female [facility]  Patient has a smoking history.  Smoking cessation was discussed with the patient using the 5 A's Tobacco Cessation Intervention Model.  Total time spent counseling the patient on smoking cessation was 6 minutes.      ASK:  Patient's smoking history includes:  Social History     Tobacco Use   Smoking Status Current Every Day Smoker   • Years: 20.00   • Types: Cigarettes   Smokeless Tobacco Never Used        ADVISE:  Patient was advised to quit smoking.  They were informed of the negative implications on their health including increase risk of: cardiovascular disease, cancer, stroke, lung disease (COPD, emphysema).  The health and financial benefits of quitting smoking were also discussed with the patient.      ASSIST:  - Recommended that the patient set a date for quitting smoking, ideally within 2 to 4 weeks  - Recommended that the patient tell family/friends about quitting and request their support  - Discussed with them the anticipated challenges, especially within the first few weeks, including nicotine withdrawal syndrome.  - Suggested to remove tobacco products from their environment.  Recommended to make their home and environment smoke free if at all possible.  - Discussed nicotine replacement therapies that are available including: patch, gum, lozenge, inhalers.  - Discussed FDA-approved medications that are available including bupropion, varenicline.  - Discussed utilizing FDA-approved medications and nicotine replacement in combination can yield improved results.  - Other resources discussed with the patient may have included: smokefree.gov; 1-800-QUIT-NOW; becomeanex.org    Nicotine replacement has been made available to the patient while admitted to the hospital if they choose to use it.    Tobacco cessation information pack  "will be provided to the patient prior to discharge.    ASSESS:    Patient is NOT ready to quit smoking.    Patient has tried to quit smoking in the past.  In the past they have tried \"cold turkey\"     Patient successfully quit smoking for several years but has gone back.  The patient noted to have increased smoking recently, especially nocturnal.  Patient does not see a problem with smoking currently.    ARRANGE:    It was recommended that the patient arrange follow-up with their primary care after discharge for continued discussion of smoking cessation.      Electronically signed by Raman Goyal MD, 08/13/22, 20:47 CDT.          "

## 2022-08-14 NOTE — PLAN OF CARE
Goal Outcome Evaluation:  Plan of Care Reviewed With: patient, sibling        Progress: improving  Outcome Evaluation: Pt is AOX4 this shift, complaints of pain improved with PRN medications, safety maintained, up with standby assist to bathroom.

## 2022-08-14 NOTE — PLAN OF CARE
Goal Outcome Evaluation:  Plan of Care Reviewed With: patient        Progress: no change  Outcome Evaluation: Pt AOx4. NIH 5 this shift. Pt turns self in bed. Voiding, purewick in place to wall suction. Pt reports R hip pain with good results from PRN meds. BP elevated tonight, MD notified and new orders received with some improvement. Safety maintained, will continue to monitor.

## 2022-08-14 NOTE — PROGRESS NOTES
Neurology Progress Note      Chief Complaint:  encpehlopathy    Subjective     Subjective:    Laying in bed this morning with no acute distress.  No family/visitors at bedside. She was transferred to  yesterday afternoon. Patient reports she did not sleep well last PM secondary to pain in right leg. Pain is from right hip to her knee. She attributes this to chronic varicose veins. BP is elevated this AM as well at 187/90. Heart rate improved and primarily 55-76 in last 24 hours. Speech much clearer this AM even with dentures out.     Social work saw patient yesterday and looking for placement as patient has difficulty caring for herself.       Medications:  Current Facility-Administered Medications   Medication Dose Route Frequency Provider Last Rate Last Admin   • acetaminophen (TYLENOL) tablet 650 mg  650 mg Oral Q4H PRN Raman Goyal MD   650 mg at 08/14/22 0647    Or   • acetaminophen (TYLENOL) 160 MG/5ML solution 650 mg  650 mg Oral Q4H PRN Raman Goyal MD        Or   • acetaminophen (TYLENOL) suppository 650 mg  650 mg Rectal Q4H PRN Raman Goyal MD       • amLODIPine (NORVASC) tablet 10 mg  10 mg Oral Q24H Jean Carlos Miller MD   10 mg at 08/14/22 0138   • aspirin chewable tablet 81 mg  81 mg Oral Daily Raman Goyal MD   81 mg at 08/14/22 0838   • atorvastatin (LIPITOR) tablet 80 mg  80 mg Oral Nightly Raman Goyal MD   80 mg at 08/13/22 2009   • Camphor-Menthol-Methyl Sal 4-10-30 % cream 1 application  1 application Apply externally 4x Daily PRN Raman Goyal MD   1 application at 08/13/22 0036   • enalaprilat (VASOTEC) injection 1.25 mg  1.25 mg Intravenous Q6H PRN Jean Carlos Miller MD   1.25 mg at 08/14/22 0022   • Enoxaparin Sodium (LOVENOX) syringe 40 mg  40 mg Subcutaneous Q24H Raman Goyal MD   40 mg at 08/13/22 1631   • gabapentin (NEURONTIN) capsule 100 mg  100 mg Oral Q8H Aranza Penn, YON       • melatonin tablet 6 mg  6 mg Oral  Nightly Raman Goyal MD   6 mg at 08/13/22 2009   • ondansetron (ZOFRAN) tablet 4 mg  4 mg Oral Q6H PRN Raman Goyal MD        Or   • ondansetron (ZOFRAN) injection 4 mg  4 mg Intravenous Q6H PRN Raman Goyal MD       • QUEtiapine (SEROquel) tablet 25 mg  25 mg Oral Nightly Raman Goyal MD   25 mg at 08/13/22 2009   • sodium chloride 0.9 % flush 10 mL  10 mL Intravenous Q12H Raman Goyal MD   10 mL at 08/13/22 0833   • sodium chloride 0.9 % flush 10 mL  10 mL Intravenous PRN Raman Goyal MD       • sodium chloride 0.9 % infusion  50 mL/hr Intravenous Continuous Raman Goyal MD 50 mL/hr at 08/14/22 0838 50 mL/hr at 08/14/22 0838       Review of Systems:   -A 14 point review of systems is completed and is negative except for mild confusion, improved.       Objective      Vital Signs  Temp:  [98 °F (36.7 °C)-99 °F (37.2 °C)] 98 °F (36.7 °C)  Heart Rate:  [55-76] 76  Resp:  [16-18] 18  BP: (140-229)/(55-94) 187/90    Physical Exam:    General Exam:  Head:  Normocephalic, atraumatic  HEENT:  Neck supple  Fundoscopic Exam:  No signs of disc edema  CVS:  Regular rate and rhythm.  No murmurs  Carotid Examination:  No bruits  Lungs:  Clear to auscultation  Abdomen:  Nontender, nondistended  Extremities:  No signs of peripheral edema  Skin:  No rashes    Neurologic Exam:    Mental Status:    -Alert, Oriented X 3 patient able to name president which she had difficulty doing yesterday. She could not name the  but could tel me  is a woman. Correctly stated # quarters in $1.75.  -No word-finding difficulties  -No aphasia  -No dysarthria  -Follows simple and complex commands    CN II:  Visual fields full.  Pupils equally reactive to light  CN III, IV, VI:  Extraocular Muscles full with no signs of nystagmus  CN V:  Facial sensory is symmetric with no asymmetries.  CN VII:  Facial motor symmetric  CN VIII:  Gross hearing intact bilaterally  CN IX:  Palate elevates  symmetrically  CN X:  Palate elevates symmetrically  CN XI:  Shoulder shrug symmetric  CN XII:  Tongue protrudes to midline  Motor: (strength out of 5:  1= minimal movement, 2 = movement in plane of gravity, 3 = movement against gravity, 4 = movement against some resistance, 5 = full strength)    -Right Upper Ext: Proximal: 5 Distal: 5  -Left Upper Ext: Proximal: 5 Distal: 5    -Right Lower Ext: Proximal: 5 Distal: 5  -Left Lower Ext: Proximal: 5 Distal: 5    DTR:  -Right   Bicep: 2+ Tricep: 2+ Brachoradialis: 2+   Patella: 2+ Ankle: 2+ Neg Babinski  -Left   Bicep: 2+ Tricep: 2+ Brachoradialis: 2+   Patella: 2+ Ankle: 2+ Neg Babinski    Sensory:  -Intact to light touch, pinprick, temperature, pain, and proprioception    Coordination:  -Finger to nose intact  -Heel to shin intact      Gait  -up with assist.     Results Review:    I reviewed the patient's new clinical results.    Results from last 7 days   Lab Units 08/14/22  0512 08/12/22  0756   WBC 10*3/mm3 13.42* 18.56*   HEMOGLOBIN g/dL 11.4* 11.0*   HEMATOCRIT % 37.4 36.2   PLATELETS 10*3/mm3 383 447        Results from last 7 days   Lab Units 08/14/22  0512 08/12/22  0756   SODIUM mmol/L 136 131*   POTASSIUM mmol/L 3.9 4.1   CHLORIDE mmol/L 103 95*   CO2 mmol/L 22.0 26.0   BUN mg/dL 16 21   CREATININE mg/dL 1.13* 1.36*   CALCIUM mg/dL 9.8 10.2   BILIRUBIN mg/dL 0.2 0.4   ALK PHOS U/L 117 108   ALT (SGPT) U/L 13 7   AST (SGOT) U/L 17 15   GLUCOSE mg/dL 113* 194*        Lab Results   Component Value Date    PHOS 2.9 08/12/2022    MG 2.1 08/12/2022    PROTIME 13.1 08/12/2022    INR 1.03 08/12/2022     No components found for: POCGLUC  No components found for: A1C  Lab Results   Component Value Date    HDL 33 (L) 08/13/2022    LDL 90 08/13/2022     No components found for: B12  Lab Results   Component Value Date    TSH 1.060 08/12/2022     MRI Brain:  No acute disease.  No sig atrophy    CTA head and neck:  No sig vascular disease.  Left PCA does not fill  completely - may be chronic    TTE:  75% EF.  No other findings    LDL:  142  TSH:  1.06  Ammonia:  20    Bradycardia overnight (pink line above)        Assessment/Plan     Hospital Problem List      Acute encephalopathy    Adenocarcinoma of endometrium, stage 2 (HCC) status post hysterectomy    Insomnia disorder, exacerbated by nocturnal nicotine use     Tobacco abuse    Sinus bradycardia    Hypertensive emergency with possible hypertensive encephalopathy    Impression:  · Strokelike symptoms with her MRI being negative.  This may have represented a TIA; however, this may have also represented a hypertensive emergency. (197/80 on presentation to the ER)  · Self-reported syncope likely secondary to severe bradycardia seen overnight  · Mild hyperlipidemia  · Leukocytosis  · Bradycardia  · Mild hyponatremia.  · Not safe to be discharged home alone secondary to frequent falls  · Right leg pain.   · B12 deficiency 316.       Plan:  · Continue working with physical, occupational, and speech therapy  · Recommend low-dose aspirin and high-dose statin  · Discharge planning recommended for skilled nursing facility  · Bradycardia treatment per primary team  · Will add low dose gabapentin 100 mg every 8 hours for pain. If she tolerates and no changes in cognition may increase.  · Consider outpatient evaluation for varicose vein.   · B12 500 mcg daily.             Aranza Penn, APRN  08/14/22  09:15 CDT

## 2022-08-14 NOTE — PROGRESS NOTES
[facility] Medicine Services  INPATIENT PROGRESS NOTE    Patient Name: Digna Russell  Date of Admission: 8/12/2022  Today's Date: 08/13/22  Length of Stay: 1  Primary Care Physician: Juanita Sotelo APRN (Inactive)    Subjective   Chief Complaint: feeling better  HPI   Patient was seen and examined at bedside.  No acute events overnight.  Patient's blood pressure has improved.  Patient denies any issues with sleeping last night.  Her speech is more clear, and the patient is doing better, she simply request getting some thing to eat.        Review of Systems   Constitutional: Positive for activity change. Negative for chills and fever.   Respiratory: Negative for cough and shortness of breath.    Cardiovascular: Negative for chest pain and palpitations.   Gastrointestinal: Negative for abdominal distention, diarrhea, nausea and vomiting.   Musculoskeletal: Negative for arthralgias and myalgias.   Neurological: Positive for speech difficulty (slowed). Negative for weakness.        All pertinent negatives and positives are as above. All other systems have been reviewed and are negative unless otherwise stated.     Objective    Temp:  [97.5 °F (36.4 °C)-99 °F (37.2 °C)] 98.3 °F (36.8 °C)  Heart Rate:  [35-67] 58  Resp:  [16-18] 18  BP: (126-186)/() 180/77  Physical Exam  Vitals and nursing note reviewed.   Constitutional:       Appearance: Normal appearance.   HENT:      Head: Normocephalic and atraumatic.      Mouth/Throat:      Mouth: Mucous membranes are moist.      Pharynx: Oropharynx is clear.   Eyes:      General: No scleral icterus.     Conjunctiva/sclera: Conjunctivae normal.   Cardiovascular:      Rate and Rhythm: Regular rhythm. Bradycardia present.   Abdominal:      General: Abdomen is flat. Bowel sounds are normal.      Palpations: Abdomen is soft. There is no mass.   Skin:     General: Skin is warm and dry.      Coloration: Skin is not jaundiced or pale.    Neurological:      General: No focal deficit present.      Mental Status: She is alert and oriented to person, place, and time.      Comments: Improved speech geovanna   Psychiatric:         Mood and Affect: Mood normal.         Behavior: Behavior normal.             Results Review:  I have reviewed the labs, radiology results, and diagnostic studies.    Laboratory Data:   Results from last 7 days   Lab Units 08/12/22  0756   WBC 10*3/mm3 18.56*   HEMOGLOBIN g/dL 11.0*   HEMATOCRIT % 36.2   PLATELETS 10*3/mm3 447        Results from last 7 days   Lab Units 08/12/22  0756   SODIUM mmol/L 131*   POTASSIUM mmol/L 4.1   CHLORIDE mmol/L 95*   CO2 mmol/L 26.0   BUN mg/dL 21   CREATININE mg/dL 1.36*   CALCIUM mg/dL 10.2   BILIRUBIN mg/dL 0.4   ALK PHOS U/L 108   ALT (SGPT) U/L 7   AST (SGOT) U/L 15   GLUCOSE mg/dL 194*       Culture Data:   No results found for: BLOODCX, URINECX, WOUNDCX, MRSACX, RESPCX, STOOLCX    Radiology Data:   Imaging Results (Last 24 Hours)     ** No results found for the last 24 hours. **          I have reviewed the patient's current medications.     Assessment/Plan     Active Hospital Problems    Diagnosis    • **Acute encephalopathy    • Hypertensive emergency with possible hypertensive encephalopathy    • Tobacco abuse    • Sinus bradycardia    • Insomnia disorder, exacerbated by nocturnal nicotine use     • Adenocarcinoma of endometrium, stage 2 (HCC) status post hysterectomy        Plan:  Admit to telemetry    Patient was not a tPA candidate due to low liklihood of stroke and outside the 4.5 hour window.    Consult neurology.  Dr. Go wants patient to be admitted to the ICU for close BP monitoring.  Since now stroke noted on the MRI, she was okay with treating BP but keeping it >140 SBP.  Did not want to drop it too low with some concern for thrombus.  Discussed with Dr. Ruff, he feels as though most likely all encephalopathy, and I agree with this assessment.  Suspect it is  related to sleep deprivation and overall deconditioning.    Aspirin 81 mg daily; High-intensity statin with atorvastatin for now    CT head showed,   CT Head Without Contrast Stroke Protocol    Result Date: 8/12/2022  EXAMINATION: CT HEAD WO CONTRAST STROKE PROTOCOL- 8/12/2022 9:38 AM CDT  HISTORY: Stroke, follow up. Dizziness, loss of consciousness.  DOSE: 591 mGycm (Automatic exposure control technique was implemented in an effort to keep the radiation dose as low as possible without compromising image quality)  REPORT:  Spiral CT of the head was performed without intravenous contrast. Reconstructed coronal and sagittal images are also reviewed.  Comparison: MRI brain without contrast 9/27/2012.  No evidence of intracranial hemorrhage, mass or mass-effect is identified. The ventricles and basal cisterns appear within normal limits. Bone windows show no skull fracture. There is chronic bilateral mastoid sclerosis as before.      Impression: Impression: No acute intracranial abnormality.    This report was finalized on 08/12/2022 09:40 by Dr. Adama Mckeon MD.       MRI brain ordered,  MRI Brain Without Contrast    Result Date: 8/12/2022  EXAMINATION: MRI BRAIN WO CONTRAST-  8/12/2022 11:44 AM CDT  HISTORY: Neuro deficit, acute, stroke suspected  The multiplanar, multisequence MR imaging of the brain is performed without intravenous contrast enhancement.  There is no previous similar study for comparison.  Correlation made with previous CT scan of the abdomen obtained earlier today.  The diffusion-weighted imaging sequence including ADC map show no areas of restricted diffusion.  There is no evidence of a mass. No midline shift.  The ventricles, basal cisterns and cortical sulci are normal.  The orbits bilaterally are normal. The optic nerves, optic chiasm and optic tract are normal.  2 2 glands are normal. The corpus callosum, the brainstem and cerebellum are normal.  The FLAIR sequence images show a few foci of  abnormal T2 signal in the periventricular and deep subcortical white matter representing areas of chronic ischemia due to small vessel disease.  The intracranial major flow voids is maintained in gradient recall imaging sequence.  The limited visualized paranasal sinuses are normal. A left mastoid effusion.      Impression: 1. No acute intracranial abnormality, particularly, no evidence of acute infarction or a mass. This report was finalized on 08/12/2022 12:10 by Dr. Gilberto Deng MD.    CTA of the head and neck shows: There is attenuation of the left posterior cerebral artery beginning approximately 5 mm from its origin and a small thrombus is not excluded. No carotid or vertebral occlusion or dissection is identified. There is minimal noncalcified plaque within the origin of the right ICA, less than 50% stenosis. There is poor visualization of the right PICA.    Echo with bubble study  Results for orders placed during the hospital encounter of 08/12/22    Adult Transthoracic Echo Complete W/ Cont if Necessary Per Protocol    Interpretation Summary  · Estimated right ventricular systolic pressure from tricuspid regurgitation is normal (<35 mmHg).  · Saline test results are negative.  · Left ventricular wall thickness is consistent with mild concentric hypertrophy.  · Estimated left ventricular EF = 75% Left ventricular systolic function is hyperdynamic (EF > 70%).  · Left ventricular diastolic function is consistent with (grade I) impaired relaxation.       Lab Results   Component Value Date    HGBA1C 5.70 (H) 08/12/2022    LDL 90 08/13/2022    HDL 33 (L) 08/13/2022         PT/OT/SLP - Appreciate assistnace    VTE PPx with SCDs and lovenox    SW consult for placement - [facility] referral sent    Melatonin qhs. Seroquel 25 mg qhs    Will start antihypertensives if BP remains elevated.  Monitor for now.    Tobacco cessation performed.  Nicotine replacement available as needed and  requested.                      Discharge Planning: I expect the patient to be discharged to SNF in 1-2 days.  Patient is medically ready for discharge, pending placement.  Patient has inability to care for herself at present conditioning.    Electronically signed by Raman Goyal MD, 08/13/22, 21:08 CDT.

## 2022-08-15 NOTE — DISCHARGE SUMMARY
[facility] Medicine Services  DISCHARGE SUMMARY       Date of Admission: 8/12/2022  Date of Discharge:  8/15/2022  Primary Care Physician: No primary care provider on file.    Discharge Diagnoses:  Active Hospital Problems    Diagnosis    • **Acute encephalopathy    • Hypertensive emergency with possible hypertensive encephalopathy    • Tobacco abuse    • Sinus bradycardia    • Insomnia disorder, exacerbated by nocturnal nicotine use     • Adenocarcinoma of endometrium, stage 2 (HCC) status post hysterectomy          Presenting Problem/History of Present Illness:  Altered mental status [R41.82]     Chief Complaint on Day of Discharge:   No specific complaint    History of Present Illness on Day of Discharge:   The patient is ready for discharge today.  She has been accepted to skilled nursing facility for further rehabilitation.  She is agreeable to this plan and is anxious to get stronger and return home.    Hospital Course  This 66-year-old female was admitted on 8/12 after presenting to the emergency department with confusion and abnormal speech.  The patient called 911 and was transported to the emergency department.  Patient reported difficulty with speech slowing and slurred.  She was unable to pinpoint time of symptom onset.  She states that she has not been sleeping well and has not slept well for the past several days.  The patient was not a tPA candidate as she appeared to outside the 4.5-hour window.  Neurology was consulted and wanted the patient admitted to the ICU for close blood pressure monitoring.  Dr. Ruff with neurology felt as though the patient's condition was most likely secondary to encephalopathy.  It was suspected that the encephalopathy was rated to sleep deprivation and overall deconditioning.  Patient was placed on aspirin daily with a high intensity statin.  Echocardiogram showed an ejection fraction of greater than 70% and grade 1 LV diastolic  dysfunction.  The patient was noted to be unable to care for herself at home.  Physical therapy and Occupational Therapy were consulted on admission.  They recommended SNF placement for rehabilitation.  The patient is medically ready for discharge to SNF.  Placement has been achieved and the patient is medically stable.  Neurology has seen and evaluated the patient recommending low-dose aspirin and high-dose statin at discharge.      Consults:   Neurology:  Impression     1.  Possible strokelike symptoms with mild right facial droop that but with a symmetric smile, dysarthria, and difficulty moving the right leg initially and now just from the hip flexors and also difficulty trying to do heel-to-shin testing using her right leg.  2. Self-report of recurrent syncope.  She reports about 50 times of passing out since 2019.  Typically is preceded by feeling dizzy and she describes the dizziness as the room spinning.  She states it could be t difficult hat she fell asleep because it always happens after she has not slept but she believes she has passed out when it was not due to sleep  3. 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 .  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 .  Hearing loss in the left ear she does not hear the finger rub and normally wears a hearing aid her records  6..  History of hyperlipidemia  7..  Leukocytosis which she states she has had in the past  8  Anemia which may be iron deficient anemia is unclear because she states her doctor gave her iron but she would not take it  9.  History of insomnia  10.  Hyponatremia  11. Hypertension for which she was given labetalol in the ED      Plan  Is not at all clear that she is absolutely  "had a stroke given the variability in the exam.  But there does seem to be some deficits including the dysarthria and subtle right facial droop.  And nursing did note some problems with the right leg initially.She also is a difficult historian as it is unclear what her current issues are but I tried to glean those as best as I could in the history.      In any event she will need an evaluation for recurrent syncope which would include the following  MRI of the brain  without contra  Lipid panel  Hemoglobin A1c  Echo with bubble  Orthostase patient  TSH  Iron profile, B12, folate  SCDs  Cardiac monitor  Consider EEG --will try and reach family     I discussed the patient's findings and my recommendations with patient, nursing staff and Dr Shlomo Walker MD      Result Review    Result Review:  I have personally reviewed the results from the time of this admission to 8/15/2022 14:04 CDT and agree with these findings:  []  Laboratory  []  Microbiology  []  Radiology  []  EKG/Telemetry   []  Cardiology/Vascular   []  Pathology  []  Old records  []  Other:      Condition on Discharge:    Stable and improved    Physical Exam on Discharge:  /79 (BP Location: Right arm, Patient Position: Sitting) Comment: notified rn  Pulse 72   Temp 97.7 °F (36.5 °C) (Oral)   Resp 18   Ht 177.8 cm (70\")   Wt 74.3 kg (163 lb 11.2 oz)   LMP  (LMP Unknown)   SpO2 97%   BMI 23.49 kg/m²   Physical Exam     Constitutional:       Appearance: Normal appearance.      Comments: Sister at bedside   HENT:      Head: Normocephalic and atraumatic.      Mouth/Throat:      Mouth: Mucous membranes are moist.      Pharynx: Oropharynx is clear.   Eyes:      General: No scleral icterus.     Conjunctiva/sclera: Conjunctivae normal.   Cardiovascular:      Rate and Rhythm: Normal rate and regular rhythm.   Abdominal:      General: Abdomen is flat. Bowel sounds are normal. There is no distension.      Palpations: Abdomen is " soft. There is no mass.   Skin:     General: Skin is warm and dry.      Coloration: Skin is not jaundiced or pale.   Neurological:      General: No focal deficit present.      Mental Status: She is alert and oriented to person, place, and time.      Comments: Improved speech geovanna   Psychiatric:         Mood and Affect: Mood normal.         Behavior: Behavior normal.     Discharge Disposition:  Skilled Nursing Facility (DC - External)    Discharge Medications:     Discharge Medications      New Medications      Instructions Start Date   amLODIPine 10 MG tablet  Commonly known as: NORVASC   10 mg, Oral, Every 24 Hours Scheduled   Start Date: August 16, 2022     QUEtiapine 25 MG tablet  Commonly known as: SEROquel   25 mg, Oral, Nightly      rOPINIRole 1 MG tablet  Commonly known as: REQUIP   1 mg, Oral, Nightly, Take 1 hour before bedtime.         Continue These Medications      Instructions Start Date   acetaminophen 325 MG tablet  Commonly known as: TYLENOL   650 mg, Oral, Every 4 Hours PRN      dorzolamide-timolol 22.3-6.8 MG/ML ophthalmic solution  Commonly known as: COSOPT   1 drop, Both Eyes, 2 Times Daily      Lumigan 0.01 % ophthalmic drops  Generic drug: bimatoprost   1 drop, Both Eyes, Nightly         Stop These Medications    diphenhydrAMINE 25 mg capsule  Commonly known as: BENADRYL            Discharge Diet:   Diet Instructions     Diet: Regular; Thin      Discharge Diet: Regular    Fluid Consistency: Thin          Discharge Care Plan / Instructions:   Discharge to skilled nursing facility    Activity at Discharge:   Activity Instructions     Activity as Tolerated               Electronically signed by Edgard Oleary DO, 08/15/22, 14:04 CDT.    Time: Discharge Over 30 min    Part of this note may be an electronic transcription/translation of spoken language to printed text using the Dragon Dictation system.

## 2022-08-15 NOTE — THERAPY EVALUATION
Patient Name: Digna Russell  : 1955    MRN: [id number]                              Today's Date: 8/15/2022       Admit Date: 2022    Visit Dx:     ICD-10-CM ICD-9-CM   1. Dysphagia, unspecified type  R13.10 787.20   2. Impaired mobility and ADLs  Z74.09 V49.89    Z78.9    3. Impaired functional mobility and activity tolerance  Z74.09 V49.89   4. Impaired cognition  R41.89 294.9     Patient Active Problem List   Diagnosis   • Adenocarcinoma of endometrium, stage 2 (HCC) status post hysterectomy   • Anxiety   • Change in bowel habits   • Deafness   • Insomnia disorder, exacerbated by nocturnal nicotine use    • Scoliosis   • Tobacco abuse   • Acute encephalopathy   • Sinus bradycardia   • Hypertensive emergency with possible hypertensive encephalopathy     Past Medical History:   Diagnosis Date   • Cancer (HCC)     uterine   • Deafness in left ear    • Glaucoma, left eye    • IBS (irritable bowel syndrome)    • Pain in right hip    • Wears hearing aid in right ear      Past Surgical History:   Procedure Laterality Date   • HYSTERECTOMY     • STEROID INJECTION Right 2022    Procedure: FLUOROSCOPIC GUIDED RIGHT HIP AND TROCHANTERIC BURSA INJECTION;  Surgeon: Ronaldo Calles MD;  Location: [facility];  Service: Orthopedics;  Laterality: Right;   • TONSILLECTOMY AND ADENOIDECTOMY     • TUBAL ABDOMINAL LIGATION Bilateral       General Information     Row Name 08/15/22 0956          Physical Therapy Time and Intention    Document Type evaluation;other (see comments)  see MAR  -FARIBA     Mode of Treatment physical therapy  -JE     Row Name 08/15/22 0956          General Information    Patient Profile Reviewed yes  -JE     Prior Level of Function independent:;all household mobility;community mobility;ADL's;home management;shopping;using stairs;dependent:;driving  -JE     Existing Precautions/Restrictions fall  -JE     Barriers to Rehab none identified  -JE     Row Name 08/15/22 0956          Living Environment     People in Home alone  -     Row Name 08/15/22 0956          Home Main Entrance    Number of Stairs, Main Entrance four  -     Stair Railings, Main Entrance railing on right side (ascending)  -     Row Name 08/15/22 0956          Stairs Within Home, Primary    Number of Stairs, Within Home, Primary none  -     Row Name 08/15/22 0956          Cognition    Orientation Status (Cognition) oriented x 4  -     Row Name 08/15/22 0956          Safety Issues, Functional Mobility    Safety Issues Affecting Function (Mobility) at risk behavior observed;safety precaution awareness;insight into deficits/self-awareness  -     Impairments Affecting Function (Mobility) balance;endurance/activity tolerance;strength  -           User Key  (r) = Recorded By, (t) = Taken By, (c) = Cosigned By    Initials Name Provider Type    Maddy Stoner, PT Physical Therapist               Mobility     Row Name 08/15/22 0956          Bed Mobility    Bed Mobility scooting/bridging;supine-sit;sit-supine  -FARIBA     Scooting/Bridging [place] (Bed Mobility) standby assist;independent;verbal cues  -FARIBA     Supine-Sit [place] (Bed Mobility) standby assist;independent;verbal cues  -FARIBA     Sit-Supine [place] (Bed Mobility) standby assist;independent;verbal cues  -     Row Name 08/15/22 0956          Sit-Stand Transfer    Sit-Stand [place] (Transfers) contact guard;verbal cues  -[facility] Name 08/15/22 0956          Gait/Stairs (Locomotion)    [place] Level (Gait) contact guard;verbal cues  -FARIBA     Distance in Feet (Gait) 60 ft  -FARIBA     Deviations/Abnormal Patterns (Gait) gait speed decreased;stride length decreased  -FARIBA     Bilateral Gait Deviations forward flexed posture;heel strike decreased  -FARIBA     Comment, (Gait/Stairs) reaches for walls at times; mild LOB noted once w/ mild veering from straight path  -           User Key  (r) = Recorded By, (t) = Taken By, (c) = Cosigned By    Initials Name Provider Type     Maddy Stoner, PT Physical Therapist               Obj/Interventions     [facility] Name 08/15/22 0956          Range of Motion Comprehensive    Comment, General Range of Motion AROM all 4 extremities WFLs  -[facility] Name 08/15/22 0956          Strength Comprehensive (MMT)    Comment, General Manual Muscle Testing (MMT) Assessment B UEs grossly 4/5, R hip flexor 3+/5, L hip flexor 4-/5, all other LE ms groups grossly 4/5  -[facility] Name 08/15/22 0956          Motor Skills    Motor Skills other (see comments)  intact to finger opposition  -[facility] Name 08/15/22 0956          Balance    Balance Assessment sitting static balance;sitting dynamic balance;sit to stand dynamic balance;standing static balance;standing dynamic balance  -     Static Sitting Balance standby assist  -     Dynamic Sitting Balance standby assist  -     Position, Sitting Balance supported;unsupported;sitting edge of bed  -     Sit to Stand Dynamic Balance contact guard;verbal cues  -     Static Standing Balance contact guard  -     Dynamic Standing Balance contact guard;verbal cues  -     Position/Device Used, Standing Balance unsupported  -JE     Row Name 08/15/22 0956          Sensory Assessment (Somatosensory)    Sensory Assessment (Somatosensory) sensation intact  -           User Key  (r) = Recorded By, (t) = Taken By, (c) = Cosigned By    Initials Name Provider Type    Maddy Stoner, PT Physical Therapist               Goals/Plan     Row Name 08/15/22 0956          Bed Mobility Goal 1 (PT)    Activity/Assistive Device (Bed Mobility Goal 1, PT) bed mobility activities, all  -     [place] Level/Cues Needed (Bed Mobility Goal 1, PT) independent  -     Time Frame (Bed Mobility Goal 1, PT) long term goal (LTG);10 days  -     Progress/Outcomes (Bed Mobility Goal 1, PT) goal ongoing  -[facility] Name 08/15/22 0956          Transfer Goal 1 (PT)    Activity/Assistive Device (Transfer Goal 1, PT)  sit-to-stand/stand-to-sit;bed-to-chair/chair-to-bed  -     [place] Level/Cues Needed (Transfer Goal 1, PT) standby assist  -JE     Time Frame (Transfer Goal 1, PT) long term goal (LTG);10 days  -JE     Progress/Outcome (Transfer Goal 1, PT) goal ongoing  -     Row Name 08/15/22 0956          Gait Training Goal 1 (PT)    Activity/Assistive Device (Gait Training Goal 1, PT) gait (walking locomotion);decrease fall risk;diminish gait deviation;improve balance and speed;increase endurance/gait distance;increase energy conservation  -     [place] Level (Gait Training Goal 1, PT) standby assist  -JE     Distance (Gait Training Goal 1, PT) 200 ft w/ less than or equal to 1 standing rest, maintaining straight path  -     Time Frame (Gait Training Goal 1, PT) long term goal (LTG);10 days  -JE     Progress/Outcome (Gait Training Goal 1, PT) goal ongoing  -     Row Name 08/15/22 0956          Stairs Goal 1 (PT)    Activity/Assistive Device (Stairs Goal 1, PT) ascending stairs;descending stairs;using handrail, right;step-to-step;decrease fall risk;improve balance and speed  -     [place] Level/Cues Needed (Stairs Goal 1, PT) standby assist;contact guard required  -     Number of Stairs (Stairs Goal 1, PT) 4  -JE     Time Frame (Stairs Goal 1, PT) long term goal (LTG);10 days  -     Progress/Outcome (Stairs Goal 1, PT) goal ongoing  -     Row Name 08/15/22 0956          Therapy Assessment/Plan (PT)    Planned Therapy Interventions (PT) balance training;bed mobility training;gait training;home exercise program;postural re-education;patient/family education;ROM (range of motion);strengthening;stair training;transfer training;other (see comments)  safety/falls prevention  -           User Key  (r) = Recorded By, (t) = Taken By, (c) = Cosigned By    Initials Name Provider Type    Maddy Stoner, PT Physical Therapist               Clinical Impression     Row Name 08/15/22 0956          Pain     Pretreatment Pain Rating 0/10 - no pain  -     Posttreatment Pain Rating 0/10 - no pain  -     Row Name 08/15/22 0956          Plan of Care Review    Plan of Care Reviewed With patient  -     Progress improving  -     Outcome Evaluation PT eval completed.  Pt pleasant and agreeable to therapy.  Pt demonstrates generalized weakness throughout all 4 extremities.  Noted decrease I w/ tfers and gait w/ decrease balance w/ mild LOB and veering from straight path.  Pt tolerated gait ~ 60 ft this date and at times would reach for wall to improve stability.  Pt will benefit from continued PT services to improve overall strength, balance, activity tolerance, safety awareness and I w/ functional mobility.  Recommend continued skilled care at discharge.  Pt plans to go to SNF for rehab.  Will follow for progress and needs.  -     Row Name 08/15/22 0956          Therapy Assessment/Plan (PT)    Patient/Family Therapy Goals Statement (PT) to improve mobility and I  -JE     Rehab Potential (PT) good, to achieve stated therapy goals  -     Criteria for Skilled Interventions Met (PT) yes;meets criteria;skilled treatment is necessary  -     Therapy Frequency (PT) 2 times/day  -     Predicted Duration of Therapy Intervention (PT) until discharge or goals achieved  -     Row Name 08/15/22 0956          Vital Signs    Pretreatment Heart Rate (beats/min) 70  -     Intratreatment Heart Rate (beats/min) 81  -     Posttreatment Heart Rate (beats/min) 73  -     O2 Delivery Pre Treatment room air  -     O2 Delivery Intra Treatment room air  -     O2 Delivery Post Treatment room air  -     Pre Patient Position Supine  -     Intra Patient Position Standing  -     Post Patient Position Supine  -     Row Name 08/15/22 0956          Positioning and Restraints    Pre-Treatment Position in bed  -     Post Treatment Position bed  -     In Bed fowlers;exit alarm on;encouraged to call for assist;call light within  reach;side rails up x2  -           User Key  (r) = Recorded By, (t) = Taken By, (c) = Cosigned By    Initials Name Provider Type    Maddy Stoner, PT Physical Therapist               Outcome Measures     Row Name 08/15/22 0956          How much help from another person do you currently need...    Turning from your back to your side while in flat bed without using bedrails? 4  -JE     Moving from lying on back to sitting on the side of a flat bed without bedrails? 4  -JE     Moving to and from a bed to a chair (including a wheelchair)? 3  -JE     Standing up from a chair using your arms (e.g., wheelchair, bedside chair)? 3  -JE     Climbing 3-5 steps with a railing? 3  -JE     To walk in hospital room? 3  -     AM-PAC 6 Clicks Score (PT) 20  -     Highest level of mobility 6 --> Walked 10 steps or more  -     Row Name 08/15/22 0956          Functional Assessment    Outcome Measure Options AM-PAC 6 Clicks Basic Mobility (PT)  -           User Key  (r) = Recorded By, (t) = Taken By, (c) = Cosigned By    Initials Name Provider Type    Maddy Stoner, PT Physical Therapist                             Physical Therapy Education                 Title: PT OT SLP Therapies (In Progress)     Topic: Physical Therapy (In Progress)     Point: Mobility training (Done)     Learning Progress Summary           Patient Acceptance, E,TB,D, VU,NR by  at 8/15/2022 1406    Comment: Education re: purpose of PT/importance of activity, for safety/falls prevention, for improved tech w/ bed mob, tfers and gait                   Point: Home exercise program (Not Started)     Learner Progress:  Not documented in this visit.          Point: Precautions (Done)     Learning Progress Summary           Patient Acceptance, E,TB,D, VU,NR by  at 8/15/2022 1406    Comment: Education re: purpose of PT/importance of activity, for safety/falls prevention, for improved tech w/ bed mob, tfers and gait                                User Key     Initials Effective Dates Name Provider Type Discipline     08/02/18 -  Maddy Ding, PT Physical Therapist PT              PT Recommendation and Plan  Planned Therapy Interventions (PT): balance training, bed mobility training, gait training, home exercise program, postural re-education, patient/family education, ROM (range of motion), strengthening, stair training, transfer training, other (see comments) (safety/falls prevention)  Plan of Care Reviewed With: patient  Progress: improving  Outcome Evaluation: PT eval completed.  Pt pleasant and agreeable to therapy.  Pt demonstrates generalized weakness throughout all 4 extremities.  Noted decrease I w/ tfers and gait w/ decrease balance w/ mild LOB and veering from straight path.  Pt tolerated gait ~ 60 ft this date and at times would reach for wall to improve stability.  Pt will benefit from continued PT services to improve overall strength, balance, activity tolerance, safety awareness and I w/ functional mobility.  Recommend continued skilled care at discharge.  Pt plans to go to SNF for rehab.  Will follow for progress and needs.     Time Calculation:    PT Charges     Row Name 08/15/22 1035             Time Calculation    Start Time 0956  -      Stop Time 1035  -      Time Calculation (min) 39 min  -      PT Received On 08/15/22  -      PT Goal Re-Cert Due Date 08/25/22  -            User Key  (r) = Recorded By, (t) = Taken By, (c) = Cosigned By    Initials Name Provider Type    Maddy Stoner, PT Physical Therapist              Therapy Charges for Today     Code Description Service Date Service Provider Modifiers Qty    58588918054 HC PT EVAL MOD COMPLEXITY 3 8/15/2022 Maddy Ding, PT GP 1          PT G-Codes  Outcome Measure Options: AM-PAC 6 Clicks Basic Mobility (PT)  AM-PAC 6 Clicks Score (PT): 20  AM-PAC 6 Clicks Score (OT): 20    Maddy Ding PT  8/15/2022

## 2022-08-15 NOTE — PLAN OF CARE
Goal Outcome Evaluation:  Plan of Care Reviewed With: patient        Progress: improving  Outcome Evaluation: PT eval completed.  Pt pleasant and agreeable to therapy.  Pt demonstrates generalized weakness throughout all 4 extremities.  Noted decrease I w/ tfers and gait w/ decrease balance w/ mild LOB and veering from straight path.  Pt tolerated gait ~ 60 ft this date and at times would reach for wall to improve stability.  Pt will benefit from continued PT services to improve overall strength, balance, activity tolerance, safety awareness and I w/ functional mobility.  Recommend continued skilled care at discharge.  Pt plans to go to SNF for rehab.  Will follow for progress and needs.

## 2022-08-15 NOTE — CASE MANAGEMENT/SOCIAL WORK
Continued Stay Note   [place]     Patient Name: Digna Russell  MRN: [id number]  Today's Date: 8/15/2022    Admit Date: 8/12/2022     Discharge Plan     Row Name 08/15/22 0957       Plan    Plan Comments PT has a bed offer at [facility] and may dc to SNF when medically ready. PT will need a COVID swab prior to dc to SNF.    Final Discharge Disposition Code 03 - skilled nursing facility (SNF)               Discharge Codes    No documentation.               Expected Discharge Date and Time     Expected Discharge Date Expected Discharge Time    Aug 14, 2022             JULISA Cummins

## 2022-08-15 NOTE — PROGRESS NOTES
Neurology Progress Note      Chief Complaint:  encpehlopathy    Subjective     Subjective:    Laying in bed.  No family/visitors at bedside. Patient states she slept much better last PM. She received one dose gabapentin 100 mg with relief of pain. Patient showered yesterday. This was then changed to ropinirole per attending. This AM patient states pain is better but remains. She refuses to do part of exam secondary to right leg pain. Referral to rehab/SNF in progress. Patient has difficulty caring for herself.     Medications:  Current Facility-Administered Medications   Medication Dose Route Frequency Provider Last Rate Last Admin   • acetaminophen (TYLENOL) tablet 650 mg  650 mg Oral Q4H PRN Raman Goyal MD   650 mg at 08/14/22 1218    Or   • acetaminophen (TYLENOL) 160 MG/5ML solution 650 mg  650 mg Oral Q4H PRN Raman Goyal MD        Or   • acetaminophen (TYLENOL) suppository 650 mg  650 mg Rectal Q4H PRN Raman Goyal MD       • amLODIPine (NORVASC) tablet 10 mg  10 mg Oral Q24H Jean Carlos Miller MD   10 mg at 08/15/22 0839   • aspirin chewable tablet 81 mg  81 mg Oral Daily Raman Goyal MD   81 mg at 08/15/22 0839   • atorvastatin (LIPITOR) tablet 80 mg  80 mg Oral Nightly Raman Goyal MD   80 mg at 08/14/22 2059   • Camphor-Menthol-Methyl Sal 4-10-30 % cream 1 application  1 application Apply externally 4x Daily PRN Raman Goyal MD   1 application at 08/13/22 0036   • Diclofenac Sodium (VOLTAREN) 1 % gel 2 g  2 g Topical 4x Daily Raman Goyal MD       • enalaprilat (VASOTEC) injection 1.25 mg  1.25 mg Intravenous Q6H PRN Jean Carlos Miller MD   1.25 mg at 08/14/22 0022   • Enoxaparin Sodium (LOVENOX) syringe 40 mg  40 mg Subcutaneous Q24H Raman Goyal MD   40 mg at 08/14/22 1540   • melatonin tablet 9 mg  9 mg Oral Nightly Raman Goyal MD   9 mg at 08/14/22 2059   • ondansetron (ZOFRAN) tablet 4 mg  4 mg Oral Q6H PRN Raman Goyal  MD Soy        Or   • ondansetron (ZOFRAN) injection 4 mg  4 mg Intravenous Q6H PRN Raman Goyal MD       • QUEtiapine (SEROquel) tablet 25 mg  25 mg Oral Nightly Raman Goyal MD   25 mg at 08/14/22 2059   • rOPINIRole (REQUIP) tablet 1 mg  1 mg Oral Nightly Raman Goyal MD   1 mg at 08/14/22 2059   • sodium chloride 0.9 % flush 10 mL  10 mL Intravenous Q12H Raman Goyal MD   10 mL at 08/15/22 0840   • sodium chloride 0.9 % flush 10 mL  10 mL Intravenous PRN Raman Goyal MD       • vitamin B-12 (CYANOCOBALAMIN) tablet 500 mcg  500 mcg Oral Daily Aranza Penn APRN   500 mcg at 08/15/22 0838       Review of Systems:   -A 14 point review of systems is completed and is negative except for mild confusion, improved.       Objective      Vital Signs  Temp:  [97.7 °F (36.5 °C)-98.7 °F (37.1 °C)] 98.3 °F (36.8 °C)  Heart Rate:  [67-74] 69  Resp:  [18] 18  BP: (157-195)/(72-81) 187/81    Telemetry S 70-72    Physical Exam:    General Exam:  Head:  Normocephalic, atraumatic  HEENT:  Neck supple  Fundoscopic Exam:  No signs of disc edema  CVS:  Regular rate and rhythm.  No murmurs  Carotid Examination:  No bruits  Lungs:  Clear to auscultation  Abdomen:  Nontender, nondistended  Extremities:  No signs of peripheral edema  Skin:  No rashes    Neurologic Exam:    Mental Status:    -Alert, Oriented X 3   -No word-finding difficulties  -No aphasia  -No dysarthria  -Follows simple and complex commands    CN II:  Visual fields full.  Pupils equally reactive to light  CN III, IV, VI:  Extraocular Muscles full with no signs of nystagmus  CN V:  Facial sensory is symmetric with no asymmetries.  CN VII:  Facial motor symmetric  CN VIII:  Gross hearing intact bilaterally  CN IX:  Palate elevates symmetrically  CN X:  Palate elevates symmetrically  CN XI:  Shoulder shrug symmetric  CN XII:  Tongue protrudes to midline  Motor: (strength out of 5:  1= minimal movement, 2 = movement in plane of  gravity, 3 = movement against gravity, 4 = movement against some resistance, 5 = full strength)    -Right Upper Ext: Proximal: 5 Distal: 5  -Left Upper Ext: Proximal: 5 Distal: 5    -Right Lower Ext: Proximal: 5 Distal: 5  -Left Lower Ext: Proximal: 5 Distal: 5    DTR:  -Right   Bicep: 2+ Tricep: 2+ Brachoradialis: 2+   Patella: 2+ Ankle: 2+ Neg Babinski  -Left   Bicep: 2+ Tricep: 2+ Brachoradialis: 2+   Patella: 2+ Ankle: 2+ Neg Babinski    Sensory:  -Intact to light touch, pinprick, temperature, pain, and proprioception    Coordination:  -Finger to nose intact  -Heel to shin intact      Gait  -up with assist.     Results Review:    I reviewed the patient's new clinical results.    Results from last 7 days   Lab Units 08/14/22  0512 08/12/22  0756   WBC 10*3/mm3 13.42* 18.56*   HEMOGLOBIN g/dL 11.4* 11.0*   HEMATOCRIT % 37.4 36.2   PLATELETS 10*3/mm3 383 447        Results from last 7 days   Lab Units 08/14/22  0512 08/12/22  0756   SODIUM mmol/L 136 131*   POTASSIUM mmol/L 3.9 4.1   CHLORIDE mmol/L 103 95*   CO2 mmol/L 22.0 26.0   BUN mg/dL 16 21   CREATININE mg/dL 1.13* 1.36*   CALCIUM mg/dL 9.8 10.2   BILIRUBIN mg/dL 0.2 0.4   ALK PHOS U/L 117 108   ALT (SGPT) U/L 13 7   AST (SGOT) U/L 17 15   GLUCOSE mg/dL 113* 194*        Lab Results   Component Value Date    PHOS 2.9 08/12/2022    MG 2.1 08/12/2022    PROTIME 13.1 08/12/2022    INR 1.03 08/12/2022     No components found for: POCGLUC  No components found for: A1C  Lab Results   Component Value Date    HDL 33 (L) 08/13/2022    LDL 90 08/13/2022     No components found for: B12  Lab Results   Component Value Date    TSH 1.060 08/12/2022     MRI Brain:  No acute disease.  No sig atrophy    CTA head and neck:  No sig vascular disease.  Left PCA does not fill completely - may be chronic    TTE:  75% EF.  No other findings    LDL:  142  TSH:  1.06  Ammonia:  20    Bradycardia overnight (pink line above)        Assessment/Plan     Hospital Problem List      Acute  encephalopathy    Adenocarcinoma of endometrium, stage 2 (HCC) status post hysterectomy    Insomnia disorder, exacerbated by nocturnal nicotine use     Tobacco abuse    Sinus bradycardia    Hypertensive emergency with possible hypertensive encephalopathy    Impression:  · Strokelike symptoms with her MRI being negative.  This may have represented a TIA; however, this may have also represented a hypertensive emergency. (197/80 on presentation to the ER)  · Self-reported syncope likely secondary to severe bradycardia seen overnight  · Mild hyperlipidemia  · Leukocytosis  · Bradycardia  · Mild hyponatremia.  · Not safe to be discharged home alone secondary to frequent falls and patient has difficulty caring for herself.   · Right leg pain.   · B12 deficiency 316.       Plan:  · Continue working with physical, occupational, and speech therapy  · Recommend low-dose aspirin and high-dose statin  · Discharge planning recommended for skilled nursing facility  · Bradycardia treatment per primary team  · Gabapentin discontinued by attending and started on Ropinirole 1 mg at HS. Will need to observe for increase impulsiveness.   · Consider outpatient evaluation for varicose vein.   · B12 500 mcg daily.   · Ok to rehab when bed ready.  · F/u neurology PRN. Neurology signing off.             Aranza Penn, APRN  08/15/22  08:42 CDT

## 2022-08-15 NOTE — PLAN OF CARE
Goal Outcome Evaluation:  Plan of Care Reviewed With: patient        Progress: improving  Outcome Evaluation: Pt A&Ox4, VSS. NIH 1 for some slurred speech. Pt up and ambulating in room this shift with minimal assistance. Pt stated she slept some tonight. No complaints of pain to R hip tonight. Safety maintained, will continue to monitor.

## 2022-08-15 NOTE — THERAPY DISCHARGE NOTE
Acute Care - Speech Language Pathology Initial Evaluation/Discharge  [facility]     Patient Name: Digna Russell  : 1955  MRN: [id number]  Today's Date: 8/15/2022               Admit Date: 2022  Informal speech/language/cognitive evaluation completed. Pt sounds minimally impaired, however pt reports her speech is at baseline. It does sound as though it could potentially be a baseline lisp. She had no difficulty with receptive or expressive language in conversation. She was oriented to person, place, and time.  She had no difficulty with abstract reasoning, immediate memory, or daily problem solving. She had mild difficulty with a word problem involving time, but was able to correct with verbal cues. SLP will sign off as pt appears to be at baseline with speech, language, and cognitive skills.  Maricarmen Yarbrough, CCC-SLP 8/15/2022 13:57 CDT    Visit Dx:    ICD-10-CM ICD-9-CM   1. Dysphagia, unspecified type  R13.10 787.20   2. Impaired mobility and ADLs  Z74.09 V49.89    Z78.9    3. Impaired functional mobility and activity tolerance  Z74.09 V49.89   4. Impaired cognition  R41.89 294.9     Patient Active Problem List   Diagnosis   • Adenocarcinoma of endometrium, stage 2 (HCC) status post hysterectomy   • Anxiety   • Change in bowel habits   • Deafness   • Insomnia disorder, exacerbated by nocturnal nicotine use    • Scoliosis   • Tobacco abuse   • Acute encephalopathy   • Sinus bradycardia   • Hypertensive emergency with possible hypertensive encephalopathy     Past Medical History:   Diagnosis Date   • Cancer (HCC)     uterine   • Deafness in left ear    • Glaucoma, left eye    • IBS (irritable bowel syndrome)    • Pain in right hip    • Wears hearing aid in right ear      Past Surgical History:   Procedure Laterality Date   • HYSTERECTOMY     • STEROID INJECTION Right 2022    Procedure: FLUOROSCOPIC GUIDED RIGHT HIP AND TROCHANTERIC BURSA INJECTION;  Surgeon: Ronaldo Calles MD;  Location: [facility]  OR;  Service: Orthopedics;  Laterality: Right;   • TONSILLECTOMY AND ADENOIDECTOMY     • TUBAL ABDOMINAL LIGATION Bilateral        SLP Recommendation and Plan  SLP Diagnosis: Cognition WFL (08/15/22 1305)     [facility] [place] Criteria for Skilled Therapy Interventions Met: no problems identified which require skilled intervention, baseline status (08/15/22 1305)  Anticipated Discharge Disposition (SLP): unknown (08/15/22 1344)                    Reason for Discharge: other (see comments) (Baseline) (08/15/22 1344)             Plan of Care Reviewed With: patient (08/15/22 1343)  Outcome Evaluation: See note (08/15/22 1343)    SLP EVALUATION (last 72 hours)     SLP SLC Evaluation     Row Name 08/15/22 1305                   Communication Assessment/Intervention    Document Type discharge evaluation/summary  -MB        Subjective Information no complaints  -MB        Patient Observations alert;cooperative  -MB        Patient/Family/Caregiver Comments/Observations No family present  -MB                  General Information    Patient Profile Reviewed yes  -MB        Pertinent History Of Current Problem Hypertensive urgency with possible hypertensive encephalopathy, tobacco abuse, sinus bradycardia, insomnia, adenocarcinoma of endometrium s/p hysterectomy  -MB        Precautions/Limitations, Vision WFL with corrective lenses  -MB        Patient Level of Education Unknown  -MB        Prior Level of Function-Communication unknown  -MB        Plans/Goals Discussed with patient  -MB        Barriers to Rehab none identified  -MB        Patient's Goals for Discharge patient did not state  -MB                  Pain    Additional Documentation Pain Scale: FACES Pre/Post-Treatment (Group)  -MB                  Pain Scale: FACES Pre/Post-Treatment    Pain: FACES Scale, Pretreatment 0-->no hurt  -MB                  Comprehension Assessment/Intervention    Comprehension Assessment/Intervention Auditory Comprehension  -MB                   Auditory Comprehension Assessment/Intervention    Auditory Comprehension (Communication) WNL  -MB                  Expression Assessment/Intervention    Expression Assessment/Intervention verbal expression  -MB                  Verbal Expression Assessment/Intervention    Verbal Expression WNL  -MB                  Motor Speech Assessment/Intervention    Motor Speech Function mild impairment  -MB        Motor Speech, Comment Pt states speech is baseline. It does sound like it could be a baseline lisp  -MB                  Cognitive Assessment Intervention- SLP    Cognitive Function (Cognition) WFL  -MB        Orientation Status (Cognition) person;place;time;WNL  -MB        Memory (Cognitive) immediate;WNL  -MB        Attention (Cognitive) sustained;WNL  -MB        Reasoning (Cognitive) absurdities;WNL  -MB        Problem Solving (Cognitive) WFL  -MB        Functional Math (Cognitive) mild impairment  -MB        Pragmatics (Communication) WFL  -MB                  SLP Evaluation Clinical Impressions    SLP Diagnosis Cognition WF  -MB        SLC Criteria for Skilled Therapy Interventions Met no problems identified which require skilled intervention;baseline status  -MB        Functional Impact no impact on function  -MB                  Recommendations    Therapy Frequency (SLP SLC) evaluation only  -MB        Anticipated Discharge Disposition (SLP) unknown  -MB              User Key  (r) = Recorded By, (t) = Taken By, (c) = Cosigned By    Initials Name Effective Dates    Maricarmen Sommers, CCC-SLP 06/16/21 -                    EDUCATION  The patient has been educated in the following areas:   Dysphagia (Swallowing Impairment).                  Time Calculation:    Time Calculation- SLP     Row Name 08/15/22 1356             Time Calculation- SLP    SLP Start Time 1305  -MB      SLP Stop Time 1356  -MB      SLP Time Calculation (min) 51 min  -MB      SLP Received On 08/15/22  -MB              Untimed Charges     [id number] Eval Speech and Production w/ Language Minutes 51  -MB              Total Minutes    Untimed Charges Total Minutes 51  -MB       Total Minutes 51  -MB            User Key  (r) = Recorded By, (t) = Taken By, (c) = Cosigned By    Initials Name Provider Type    Maricarmen Sommers CCC-SLP Speech and Language Pathologist                Therapy Charges for Today     Code Description Service Date Service Provider Modifiers Qty    46868810309 HC ST EVAL SPEECH AND PROD W LANG  3 8/15/2022 Maricarmen Yarbrough CCC-SLP GN 1                   SLP Discharge Summary  Anticipated Discharge Disposition (SLP): unknown  Reason for Discharge: other (see comments) (Baseline)  Progress Toward Achieving Short/long Term Goals: other (see comments) (Baseline)  Discharge Destination: other (see comments) (Still in acute care)    LINO Jiménez  8/15/2022

## 2022-08-15 NOTE — PLAN OF CARE
Goal Outcome Evaluation:               Resting well throughout the day.  Last performed NIHSS 2.  Plans for d/c to nursing home later this afternoon.  No complaints of pain.  Blood pressure remains elevated, but within parameters set for PRN medications.  Voiding.  No neurological changes noted throughout the day.

## 2022-08-15 NOTE — THERAPY TREATMENT NOTE
Patient Name: Digna Russell  : 1955    MRN: [id number]                              Today's Date: 8/15/2022       Admit Date: 2022    Visit Dx: Therapist utilized gait belt, applied non-slipped socks, provided fall risk education/prevention, & facilitated muscle strengthening PRN to reduce patient falls risk during this session.      ICD-10-CM ICD-9-CM   1. Dysphagia, unspecified type  R13.10 787.20   2. Impaired mobility and ADLs  Z74.09 V49.89    Z78.9    3. Impaired functional mobility and activity tolerance  Z74.09 V49.89     Patient Active Problem List   Diagnosis   • Adenocarcinoma of endometrium, stage 2 (HCC) status post hysterectomy   • Anxiety   • Change in bowel habits   • Deafness   • Insomnia disorder, exacerbated by nocturnal nicotine use    • Scoliosis   • Tobacco abuse   • Acute encephalopathy   • Sinus bradycardia   • Hypertensive emergency with possible hypertensive encephalopathy     Past Medical History:   Diagnosis Date   • Cancer (HCC)     uterine   • Deafness in left ear    • Glaucoma, left eye    • IBS (irritable bowel syndrome)    • Pain in right hip    • Wears hearing aid in right ear      Past Surgical History:   Procedure Laterality Date   • HYSTERECTOMY     • STEROID INJECTION Right 2022    Procedure: FLUOROSCOPIC GUIDED RIGHT HIP AND TROCHANTERIC BURSA INJECTION;  Surgeon: Ronaldo Calles MD;  Location: [facility];  Service: Orthopedics;  Laterality: Right;   • TONSILLECTOMY AND ADENOIDECTOMY     • TUBAL ABDOMINAL LIGATION Bilateral       General Information     Row Name 08/15/22 1038          OT Time and Intention    Document Type therapy note (daily note)  -MT     Mode of Treatment occupational therapy  -MT     Row Name 08/15/22 1038          General Information    Patient Profile Reviewed yes  -MT     Existing Precautions/Restrictions fall  -MT     Row Name 08/15/22 1038          Safety Issues, Functional Mobility    Impairments Affecting Function (Mobility)  balance;endurance/activity tolerance;strength;pain  -MT           User Key  (r) = Recorded By, (t) = Taken By, (c) = Cosigned By    Initials Name Provider Type    MT Evette Crow COTA Occupational Therapist Assistant                 Mobility/ADL's     [facility] Name 08/15/22 1038          Bed Mobility    Supine-Sit [place] (Bed Mobility) verbal cues;supervision  -MT     Sit-Supine [place] (Bed Mobility) supervision  -MT     Assistive Device (Bed Mobility) head of bed elevated  -MT     Row Name 08/15/22 1038          Transfers    Transfers sit-stand transfer;stand-sit transfer;toilet transfer  -MT     Sit-Stand [place] (Transfers) standby assist  -MT     Stand-Sit [place] (Transfers) standby assist  -MT     [place] Level (Toilet Transfer) standby assist  -MT     Assistive Device (Toilet Transfer) commode  -MT     Row Name 08/15/22 1038          Toilet Transfer    Type (Toilet Transfer) stand pivot/stand step  -MT     Row Name 08/15/22 1038          Functional Mobility    Functional Mobility- Ind. Level standby assist  -MT     Functional Mobility- Comment in room <> BR with no AD needed, no LOB noted  -[facility] Name 08/15/22 1038          Activities of Daily Living    BADL Assessment/Intervention lower body dressing;toileting;grooming  -MT     Row Name 08/15/22 1038          Lower Body Dressing Assessment/Training    [place] Level (Lower Body Dressing) lower body dressing skills;shoes/slippers;supervision  -MT     Position (Lower Body Dressing) edge of bed sitting  -[facility] Name 08/15/22 1038          Toileting Assessment/Training    [place] Level (Toileting) toileting skills;standby assist  -MT     Position (Toileting) unsupported sitting;unsupported standing  -[facility] Name 08/15/22 1038          Grooming Assessment/Training    [place] Level (Grooming) grooming skills;standby assist  -MT     Position (Grooming) sink side  -MT     Comment, (Grooming) sink side, hair/face   "-MT           User Key  (r) = Recorded By, (t) = Taken By, (c) = Cosigned By    Initials Name Provider Type    Evette Wilcox COTA Occupational Therapist Assistant               Obj/Interventions    No documentation.                Goals/Plan    No documentation.                Clinical Impression     Row Name 08/15/22 1038          Pain Assessment    Pretreatment Pain Rating 0/10 - no pain  -MT     Posttreatment Pain Rating 4/10  -MT     Pain Location - Side/Orientation Right  -MT     Pain Location lower  -MT     Pain Location - extremity  -MT     Pre/Posttreatment Pain Comment \"varicose veins\"  -MT     Row Name 08/15/22 1038          Plan of Care Review    Progress improving  -[facility] Name 08/15/22 1038          Therapy Plan Review/Discharge Plan (OT)    Anticipated Discharge Disposition (OT) home with home health;skilled nursing facility;home with 24/7 care  -[facility] Name 08/15/22 1038          Vital Signs    O2 Delivery Pre Treatment room air  -[facility] Name 08/15/22 1038          Positioning and Restraints    Pre-Treatment Position in bed  -MT     Post Treatment Position bed  -MT     In Bed fowlers;call light within reach;encouraged to call for assist;exit alarm on;side rails up x2  -MT           User Key  (r) = Recorded By, (t) = Taken By, (c) = Cosigned By    Initials Name Provider Type    Evette Wilcox COTA Occupational Therapist Assistant               Outcome Measures     Row Name 08/15/22 1038          How much help from another is currently needed...    Putting on and taking off regular lower body clothing? 3  -MT     Bathing (including washing, rinsing, and drying) 3  -MT     Toileting (which includes using toilet bed pan or urinal) 3  -MT     Putting on and taking off regular upper body clothing 3  -MT     Taking care of personal grooming (such as brushing teeth) 4  -MT     Eating meals 4  -MT     AM-PAC 6 Clicks Score (OT) 20  -MT     Row Name 08/15/22 0956          How much help " from another person do you currently need...    Turning from your back to your side while in flat bed without using bedrails? 4  -JE     Moving from lying on back to sitting on the side of a flat bed without bedrails? 4  -JE     Moving to and from a bed to a chair (including a wheelchair)? 3  -JE     Standing up from a chair using your arms (e.g., wheelchair, bedside chair)? 3  -JE     Climbing 3-5 steps with a railing? 3  -JE     To walk in hospital room? 3  -JE     AM-PAC 6 Clicks Score (PT) 20  -JE     Highest level of mobility 6 --> Walked 10 steps or more  -     Row Name 08/15/22 0956          Functional Assessment    Outcome Measure Options AM-PAC 6 Clicks Basic Mobility (PT)  -           User Key  (r) = Recorded By, (t) = Taken By, (c) = Cosigned By    Initials Name Provider Type    Evette Wilcox COTA Occupational Therapist Assistant    Maddy Stoner, PT Physical Therapist                Occupational Therapy Education                 Title: PT OT SLP Therapies (In Progress)     Topic: Occupational Therapy (In Progress)     Point: ADL training (Done)     Description:   Instruct learner(s) on proper safety adaptation and remediation techniques during self care or transfers.   Instruct in proper use of assistive devices.              Learning Progress Summary           Patient Acceptance, E, VU by MT at 8/15/2022 1059    Comment: fall risk, need for assist    Acceptance, E, VU,NR by  at 8/14/2022 1110                   Point: Home exercise program (Not Started)     Description:   Instruct learner(s) on appropriate technique for monitoring, assisting and/or progressing therapeutic exercises/activities.              Learner Progress:  Not documented in this visit.          Point: Precautions (Done)     Description:   Instruct learner(s) on prescribed precautions during self-care and functional transfers.              Learning Progress Summary           Patient Acceptance, E, VU by MT at 8/15/2022  1059    Comment: fall risk, need for assist    Brenda, MANUEL CORRAL,NR by  at 8/14/2022 1110                   Point: Body mechanics (Not Started)     Description:   Instruct learner(s) on proper positioning and spine alignment during self-care, functional mobility activities and/or exercises.              Learner Progress:  Not documented in this visit.                      User Key     Initials Effective Dates Name Provider Type Discipline    MT 06/16/21 -  Evette Crow COTA Occupational Therapist Assistant OT    YULIA 06/20/22 -  Katina Salas, OTR/L Occupational Therapist OT              OT Recommendation and Plan     Plan of Care Review  Plan of Care Reviewed With: patient  Progress: improving  Outcome Evaluation: pt SBA bed mobility and all fxl mobility with no AD but does demo fatigue with mobility and increased pain d/t RLE (reports d/t varicose veins). Pt reports libing home I and having difficulty sit<>stand from her chairs at home. Worked on multiple sit<>stands from different surfaces and pt does demo increased difficulty and time needed after 3rd stand. Would benefit from further strengthening prior to home d/t living home I and reports of difficulty getting up/down to cook meals for self and provide adequate self care. Recommend SNF     Time Calculation:    Time Calculation- OT     Row Name 08/15/22 1038             Time Calculation- OT    OT Start Time 1038  -MT      OT Stop Time 1102  -MT      OT Time Calculation (min) 24 min  -MT      Total Timed Code Minutes- OT 24 minute(s)  -MT      OT Received On 08/15/22  -MT            User Key  (r) = Recorded By, (t) = Taken By, (c) = Cosigned By    Initials Name Provider Type    MT Evette Crow COTA Occupational Therapist Assistant              Therapy Charges for Today     Code Description Service Date Service Provider Modifiers Qty    [id number]  OT SELF CARE/MGMT/TRAIN EA 15 MIN 8/15/2022 Evette Crow COTA GO 2               Evette Crow  HANDLEY  8/15/2022

## 2022-08-15 NOTE — PROGRESS NOTES
[facility] Medicine Services  INPATIENT PROGRESS NOTE    Patient Name: Digna Russell  Date of Admission: 8/12/2022  Today's Date: 08/14/22  Length of Stay: 2  Primary Care Physician: Juanita Sotelo APRN (Inactive)    Subjective   Chief Complaint: feeling better  HPI     Patient was seen and examined at bedside.  Patient's sister is at bedside.  The patient is indicating she wants to go tomorrow and that she will go to a nursing home when she is ready.  Her sister and I discussed with her that she will need to go to rehab straight from here.  She voiced understanding and is agreeable.          Review of Systems   Constitutional: Positive for activity change.   Respiratory: Negative for shortness of breath.    Cardiovascular: Negative for palpitations.   Gastrointestinal: Negative for abdominal distention and diarrhea.   Neurological: Positive for speech difficulty (slowed).        All pertinent negatives and positives are as above. All other systems have been reviewed and are negative unless otherwise stated.     Objective    Temp:  [97.7 °F (36.5 °C)-98.6 °F (37 °C)] 97.7 °F (36.5 °C)  Heart Rate:  [67-76] 72  Resp:  [16-18] 18  BP: (159-229)/(68-94) 159/72  Physical Exam  Vitals and nursing note reviewed.   Constitutional:       Appearance: Normal appearance.      Comments: Sister at bedside   HENT:      Head: Normocephalic and atraumatic.      Mouth/Throat:      Mouth: Mucous membranes are moist.      Pharynx: Oropharynx is clear.   Eyes:      General: No scleral icterus.     Conjunctiva/sclera: Conjunctivae normal.   Cardiovascular:      Rate and Rhythm: Normal rate and regular rhythm.   Abdominal:      General: Abdomen is flat. Bowel sounds are normal. There is no distension.      Palpations: Abdomen is soft. There is no mass.   Skin:     General: Skin is warm and dry.      Coloration: Skin is not jaundiced or pale.   Neurological:      General: No focal deficit present.       Mental Status: She is alert and oriented to person, place, and time.      Comments: Improved speech geovanna   Psychiatric:         Mood and Affect: Mood normal.         Behavior: Behavior normal.             Results Review:  I have reviewed the labs, radiology results, and diagnostic studies.    Laboratory Data:   Results from last 7 days   Lab Units 08/14/22  0512 08/12/22  0756   WBC 10*3/mm3 13.42* 18.56*   HEMOGLOBIN g/dL 11.4* 11.0*   HEMATOCRIT % 37.4 36.2   PLATELETS 10*3/mm3 383 447        Results from last 7 days   Lab Units 08/14/22  0512 08/12/22  0756   SODIUM mmol/L 136 131*   POTASSIUM mmol/L 3.9 4.1   CHLORIDE mmol/L 103 95*   CO2 mmol/L 22.0 26.0   BUN mg/dL 16 21   CREATININE mg/dL 1.13* 1.36*   CALCIUM mg/dL 9.8 10.2   BILIRUBIN mg/dL 0.2 0.4   ALK PHOS U/L 117 108   ALT (SGPT) U/L 13 7   AST (SGOT) U/L 17 15   GLUCOSE mg/dL 113* 194*       Culture Data:   No results found for: BLOODCX, URINECX, WOUNDCX, MRSACX, RESPCX, STOOLCX    Radiology Data:   Imaging Results (Last 24 Hours)     ** No results found for the last 24 hours. **          I have reviewed the patient's current medications.     Assessment/Plan     Active Hospital Problems    Diagnosis    • **Acute encephalopathy    • Hypertensive emergency with possible hypertensive encephalopathy    • Tobacco abuse    • Sinus bradycardia    • Insomnia disorder, exacerbated by nocturnal nicotine use     • Adenocarcinoma of endometrium, stage 2 (HCC) status post hysterectomy        Plan:  Admit to telemetry    Patient was not a tPA candidate due to low liklihood of stroke and outside the 4.5 hour window.    Consult neurology.  Dr. Go wants patient to be admitted to the ICU for close BP monitoring.  Since now stroke noted on the MRI, she was okay with treating BP but keeping it >140 SBP.  Did not want to drop it too low with some concern for thrombus.  Discussed with Dr. Ruff, he feels as though most likely all encephalopathy, and I agree  with this assessment.  Suspect it is related to sleep deprivation and overall deconditioning.    Aspirin 81 mg daily; High-intensity statin with atorvastatin for now    CT head showed,   CT Head Without Contrast Stroke Protocol    Result Date: 8/12/2022  EXAMINATION: CT HEAD WO CONTRAST STROKE PROTOCOL- 8/12/2022 9:38 AM CDT  HISTORY: Stroke, follow up. Dizziness, loss of consciousness.  DOSE: 591 mGycm (Automatic exposure control technique was implemented in an effort to keep the radiation dose as low as possible without compromising image quality)  REPORT:  Spiral CT of the head was performed without intravenous contrast. Reconstructed coronal and sagittal images are also reviewed.  Comparison: MRI brain without contrast 9/27/2012.  No evidence of intracranial hemorrhage, mass or mass-effect is identified. The ventricles and basal cisterns appear within normal limits. Bone windows show no skull fracture. There is chronic bilateral mastoid sclerosis as before.      Impression: Impression: No acute intracranial abnormality.    This report was finalized on 08/12/2022 09:40 by Dr. Adama Mckeon MD.       MRI brain ordered,  MRI Brain Without Contrast    Result Date: 8/12/2022  EXAMINATION: MRI BRAIN WO CONTRAST-  8/12/2022 11:44 AM CDT  HISTORY: Neuro deficit, acute, stroke suspected  The multiplanar, multisequence MR imaging of the brain is performed without intravenous contrast enhancement.  There is no previous similar study for comparison.  Correlation made with previous CT scan of the abdomen obtained earlier today.  The diffusion-weighted imaging sequence including ADC map show no areas of restricted diffusion.  There is no evidence of a mass. No midline shift.  The ventricles, basal cisterns and cortical sulci are normal.  The orbits bilaterally are normal. The optic nerves, optic chiasm and optic tract are normal.  2 2 glands are normal. The corpus callosum, the brainstem and cerebellum are normal.  The  FLAIR sequence images show a few foci of abnormal T2 signal in the periventricular and deep subcortical white matter representing areas of chronic ischemia due to small vessel disease.  The intracranial major flow voids is maintained in gradient recall imaging sequence.  The limited visualized paranasal sinuses are normal. A left mastoid effusion.      Impression: 1. No acute intracranial abnormality, particularly, no evidence of acute infarction or a mass. This report was finalized on 08/12/2022 12:10 by Dr. Gilberto Deng MD.    CTA of the head and neck shows: There is attenuation of the left posterior cerebral artery beginning approximately 5 mm from its origin and a small thrombus is not excluded. No carotid or vertebral occlusion or dissection is identified. There is minimal noncalcified plaque within the origin of the right ICA, less than 50% stenosis. There is poor visualization of the right PICA.    Echo with bubble study  Results for orders placed during the hospital encounter of 08/12/22    Adult Transthoracic Echo Complete W/ Cont if Necessary Per Protocol    Interpretation Summary  · Estimated right ventricular systolic pressure from tricuspid regurgitation is normal (<35 mmHg).  · Saline test results are negative.  · Left ventricular wall thickness is consistent with mild concentric hypertrophy.  · Estimated left ventricular EF = 75% Left ventricular systolic function is hyperdynamic (EF > 70%).  · Left ventricular diastolic function is consistent with (grade I) impaired relaxation.       Lab Results   Component Value Date    HGBA1C 5.70 (H) 08/12/2022    LDL 90 08/13/2022    HDL 33 (L) 08/13/2022       PT/OT/SLP - Appreciate assistnace    VTE PPx with SCDs and lovenox    SW consult for placement - [facility] referral sent    Melatonin 9 mg qhs. Seroquel 25 mg qhs    Tobacco cessation performed.  Nicotine replacement available as needed and requested.    Placement pending.      Voltaren gel applied to  legs   Will start requip at night    Discharge Planning: I expect the patient to be discharged to SNF in 1-2 days.  Patient is medically ready for discharge, pending placement.  Patient has inability to care for herself at present conditioning.    Electronically signed by Raman Goyal MD, 08/14/22, 19:18 CDT.

## 2022-08-15 NOTE — PLAN OF CARE
Goal Outcome Evaluation:  Plan of Care Reviewed With: patient        Progress: improving  Outcome Evaluation: pt SBA bed mobility and all fxl mobility with no AD but does demo fatigue with mobility and increased pain d/t RLE (reports d/t varicose veins). Pt reports libing home I and having difficulty sit<>stand from her chairs at home. Worked on multiple sit<>stands from different surfaces and pt does demo increased difficulty and time needed after 3rd stand. Would benefit from further strengthening prior to home d/t living home I and reports of difficulty getting up/down to cook meals for self and provide adequate self care. Recommend SNF

## 2022-08-16 NOTE — THERAPY DISCHARGE NOTE
Acute Care - Occupational Therapy Discharge Summary  [facility]     Patient Name: Digna Russell  : 1955  MRN: [id number]    Today's Date: 2022                 Admit Date: 2022        OT Recommendation and Plan    Visit Dx:    ICD-10-CM ICD-9-CM   1. Dysphagia, unspecified type  R13.10 787.20   2. Impaired mobility and ADLs  Z74.09 V49.89    Z78.9    3. Impaired functional mobility and activity tolerance  Z74.09 V49.89   4. Impaired cognition  R41.89 294.9                OT Rehab Goals     Row Name 22 1500             Transfer Goal 1 (OT)    Activity/Assistive Device (Transfer Goal 1, OT) toilet  -CS      [place] Level/Cues Needed (Transfer Goal 1, OT) modified independence  -CS      Time Frame (Transfer Goal 1, OT) long term goal (LTG);10 days  -CS      Progress/Outcome (Transfer Goal 1, OT) goal not met  -CS              Bathing Goal 1 (OT)    Activity/Device (Bathing Goal 1, OT) bathing skills, all  -CS      [place] Level/Cues Needed (Bathing Goal 1, OT) modified independence  -CS      Time Frame (Bathing Goal 1, OT) long term goal (LTG);10 days  -CS      Progress/Outcomes (Bathing Goal 1, OT) goal not met  -CS              Dressing Goal 1 (OT)    Activity/Device (Dressing Goal 1, OT) dressing skills, all  -CS      [place]/Cues Needed (Dressing Goal 1, OT) modified independence  -CS      Time Frame (Dressing Goal 1, OT) long term goal (LTG);10 days  -CS      Progress/Outcome (Dressing Goal 1, OT) goal not met  -CS              Toileting Goal 1 (OT)    Activity/Device (Toileting Goal 1, OT) toileting skills, all  -CS      [place] Level/Cues Needed (Toileting Goal 1, OT) modified independence  -CS      Time Frame (Toileting Goal 1, OT) long term goal (LTG);10 days  -CS      Progress/Outcome (Toileting Goal 1, OT) goal not met  -CS            User Key  (r) = Recorded By, (t) = Taken By, (c) = Cosigned By    Initials Name Provider Type Discipline    CS Karla Dunbar  OTR/L, JESSICA Occupational Therapist OT                            OT Discharge Summary  Anticipated Discharge Disposition (OT): home with home health, skilled nursing facility, home with 24/7 care  Reason for Discharge: Discharge from facility  Outcomes Achieved: Refer to plan of care for updates on goals achieved  Discharge Destination: SNF      Karla Dunbar, OTR/L, CNT  8/16/2022

## 2022-08-16 NOTE — THERAPY DISCHARGE NOTE
Acute Care - Physical Therapy Discharge Summary  [facility]       Patient Name: Digna Russell  : 1955  MRN: [id number]    Today's Date: 2022                 Admit Date: 2022      PT Recommendation and Plan    Visit Dx:    ICD-10-CM ICD-9-CM   1. Dysphagia, unspecified type  R13.10 787.20   2. Impaired mobility and ADLs  Z74.09 V49.89    Z78.9    3. Impaired functional mobility and activity tolerance  Z74.09 V49.89   4. Impaired cognition  R41.89 294.9                PT Rehab Goals     Row Name 22 0742             Bed Mobility Goal 1 (PT)    Activity/Assistive Device (Bed Mobility Goal 1, PT) bed mobility activities, all  -      [place] Level/Cues Needed (Bed Mobility Goal 1, PT) independent  -AH      Time Frame (Bed Mobility Goal 1, PT) long term goal (LTG);10 days  -      Progress/Outcomes (Bed Mobility Goal 1, PT) goal not met  -              Transfer Goal 1 (PT)    Activity/Assistive Device (Transfer Goal 1, PT) sit-to-stand/stand-to-sit;bed-to-chair/chair-to-bed  -      [place] Level/Cues Needed (Transfer Goal 1, PT) standby assist  -      Time Frame (Transfer Goal 1, PT) long term goal (LTG);10 days  -AH      Progress/Outcome (Transfer Goal 1, PT) goal not met  -              Gait Training Goal 1 (PT)    Activity/Assistive Device (Gait Training Goal 1, PT) gait (walking locomotion);decrease fall risk;diminish gait deviation;improve balance and speed;increase endurance/gait distance;increase energy conservation  -      [place] Level (Gait Training Goal 1, PT) standby assist  -AH      Distance (Gait Training Goal 1, PT) 200 ft w/ less than or equal to 1 standing rest, maintaining straight path  -      Time Frame (Gait Training Goal 1, PT) long term goal (LTG);10 days  -      Progress/Outcome (Gait Training Goal 1, PT) goal not met  -              Stairs Goal 1 (PT)    Activity/Assistive Device (Stairs Goal 1, PT) ascending stairs;descending stairs;using  handrail, right;step-to-step;decrease fall risk;improve balance and speed  -      [place] Level/Cues Needed (Stairs Goal 1, PT) standby assist;contact guard required  -      Number of Stairs (Stairs Goal 1, PT) 4  -      Time Frame (Stairs Goal 1, PT) long term goal (LTG);10 days  -      Progress/Outcome (Stairs Goal 1, PT) goal not met  -            User Key  (r) = Recorded By, (t) = Taken By, (c) = Cosigned By    Initials Name Provider Type Discipline     lAyssa Hills PTA Physical Therapist Assistant PT                    PT Discharge Summary  Reason for Discharge: Discharge from facility  Outcomes Achieved: Discharge from facility occurred on same date as evluation  Discharge Destination: [facility]      Alyssa Hills PTA   8/16/2022

## 2022-08-24 NOTE — TELEPHONE ENCOUNTER
Tried to call pt back but she did not answer.  Called [facility] and gave verbal orders to Carolann (pt was supposed to take a copy of today's note with her, but nurse said she did not) - soap suds enema now, one bottle of Mag Citrate now, Miralax 17g daily thereafter.

## 2022-08-24 NOTE — TELEPHONE ENCOUNTER
Caller: Digna Russell    Relationship: Self    Best call back number: [phone]    What is the best time to reach you: ANY    Who are you requesting to speak with (clinical staff, provider,  specific staff member): CLINICAL    What was the call regarding: PATIENT REQUESTING CALLBACK WHEN HER CONSTIPATION MEDICATION IS SENT TO THE PHARMACY.    Do you require a callback: YES

## 2022-08-24 NOTE — PROGRESS NOTES
Subjective     Chief Complaint   Patient presents with   • Hospital Follow Up Visit     Dysphagia  Bp has been high  Needs office visit faxed to [place] [facility] [place]   • Constipation     Hasn't had a bowel movement 7-8 days  Has thrown up medications       History of Present Illness  Patient presents the office for hospital follow-up.  She had been admitted with hypertensive urgency with encephalopathy.  On discharge she was sent to [facility] nursing [facility] in [place].  Patient is doing okay in general her blood pressure is reported as controlled at this time.  She is having issues with constipation.  She has not had a bowel movement for approximately a week now.  She notes that have tried some oral medications but nothing has helped.  Patient's PMR from outside medical facility reviewed and noted.    Review of Systems     Otherwise complete ROS reviewed and negative except as mentioned in the HPI.    Past Medical History:   Past Medical History:   Diagnosis Date   • Cancer (HCC)     uterine   • Deafness in left ear    • Glaucoma, left eye    • IBS (irritable bowel syndrome)    • Pain in right hip    • Wears hearing aid in right ear      Past Surgical History:  Past Surgical History:   Procedure Laterality Date   • HYSTERECTOMY     • STEROID INJECTION Right 4/21/2022    Procedure: FLUOROSCOPIC GUIDED RIGHT HIP AND TROCHANTERIC BURSA INJECTION;  Surgeon: Ronaldo Calles MD;  Location: [facility];  Service: Orthopedics;  Laterality: Right;   • TONSILLECTOMY AND ADENOIDECTOMY     • TUBAL ABDOMINAL LIGATION Bilateral      Social History:  reports that she has quit smoking. Her smoking use included cigarettes. She quit after 20.00 years of use. She has never used smokeless tobacco. She reports that she does not drink alcohol and does not use drugs.    Family History: family history includes Stroke in her brother and father.      Allergies:  No Known Allergies  Medications:  Prior to Admission medications   "  Medication Sig Start Date End Date Taking? Authorizing Provider   acetaminophen (TYLENOL) 325 MG tablet Take 650 mg by mouth Every 4 (Four) Hours As Needed for Mild Pain  or Moderate Pain .   Yes ProviderJulisa MD   amLODIPine (NORVASC) 10 MG tablet Take 1 tablet by mouth Daily. 8/16/22  Yes Edgard Oleary DO   dorzolamide-timolol (COSOPT) 22.3-6.8 MG/ML ophthalmic solution Administer 1 drop to both eyes 2 (Two) Times a Day. 6/9/21  Yes Julisa Muller MD   Lumigan 0.01 % ophthalmic drops Administer 1 drop to both eyes Every Night. 6/24/21  Yes ProviderJulisa MD   QUEtiapine (SEROquel) 25 MG tablet Take 1 tablet by mouth Every Night. 8/15/22  Yes Edgard Oleary DO   rOPINIRole (REQUIP) 1 MG tablet Take 1 tablet by mouth Every Night. Take 1 hour before bedtime. 8/15/22  Yes Edgard Oleary DO   aspirin 81 MG chewable tablet Chew 1 tablet Daily. 8/16/22   Edgard Oleary DO   atorvastatin (LIPITOR) 80 MG tablet Take 1 tablet by mouth Every Night. 8/15/22   Edgard Oleary, DO       Objective     Vital Signs: /84 (BP Location: Left arm, Patient Position: Sitting, Cuff Size: Adult)   Pulse 94   Temp 97.1 °F (36.2 °C)   Ht 177.8 cm (70\")   Wt 71.2 kg (157 lb)   LMP  (LMP Unknown)   SpO2 99%   BMI 22.53 kg/m²   Physical Exam  Vitals and nursing note reviewed.   Constitutional:       Appearance: Normal appearance. She is well-developed.   HENT:      Head: Normocephalic and atraumatic.      Right Ear: External ear normal.      Left Ear: External ear normal.      Ears:      Comments: Hard of hearing     Nose: Nose normal.      Mouth/Throat:      Mouth: Mucous membranes are moist.   Eyes:      Extraocular Movements: Extraocular movements intact.      Conjunctiva/sclera: Conjunctivae normal.      Pupils: Pupils are equal, round, and reactive to light.   Neck:      Thyroid: No thyromegaly.      Vascular: No JVD.      Trachea: No tracheal deviation.   Cardiovascular: "      Rate and Rhythm: Normal rate and regular rhythm.      Pulses: Normal pulses.      Heart sounds: Normal heart sounds. No murmur heard.    No friction rub. No gallop.   Pulmonary:      Effort: Pulmonary effort is normal.      Breath sounds: Normal breath sounds.   Abdominal:      General: Bowel sounds are normal. There is no distension.      Palpations: Abdomen is soft.      Tenderness: There is abdominal tenderness (generalized).   Musculoskeletal:         General: Normal range of motion.      Cervical back: Normal range of motion and neck supple.   Lymphadenopathy:      Cervical: No cervical adenopathy.   Skin:     General: Skin is warm and dry.      Capillary Refill: Capillary refill takes less than 2 seconds.   Neurological:      Mental Status: She is alert and oriented to person, place, and time.      Cranial Nerves: No cranial nerve deficit.      Coordination: Coordination normal.      Comments: Speech is slow   Psychiatric:         Mood and Affect: Mood normal.         Behavior: Behavior normal.         BMI is within normal parameters. No other follow-up for BMI required.      Results Reviewed:  Glucose   Date Value Ref Range Status   08/14/2022 113 (H) 65 - 99 mg/dL Final     BUN   Date Value Ref Range Status   08/14/2022 16 8 - 23 mg/dL Final     Creatinine   Date Value Ref Range Status   08/14/2022 1.13 (H) 0.57 - 1.00 mg/dL Final     Sodium   Date Value Ref Range Status   08/14/2022 136 136 - 145 mmol/L Final     Potassium   Date Value Ref Range Status   08/14/2022 3.9 3.5 - 5.2 mmol/L Final     Chloride   Date Value Ref Range Status   08/14/2022 103 98 - 107 mmol/L Final     CO2   Date Value Ref Range Status   08/14/2022 22.0 22.0 - 29.0 mmol/L Final     Calcium   Date Value Ref Range Status   08/14/2022 9.8 8.6 - 10.5 mg/dL Final     ALT (SGPT)   Date Value Ref Range Status   08/14/2022 13 1 - 33 U/L Final     AST (SGOT)   Date Value Ref Range Status   08/14/2022 17 1 - 32 U/L Final     WBC   Date  Value Ref Range Status   08/14/2022 13.42 (H) 3.40 - 10.80 10*3/mm3 Final   08/02/2021 9.30 3.40 - 10.80 10*3/mm3 Final     Hematocrit   Date Value Ref Range Status   08/14/2022 37.4 34.0 - 46.6 % Final     Platelets   Date Value Ref Range Status   08/14/2022 383 140 - 450 10*3/mm3 Final     Total Cholesterol   Date Value Ref Range Status   08/13/2022 154 0 - 200 mg/dL Final     Triglycerides   Date Value Ref Range Status   08/13/2022 180 (H) 0 - 150 mg/dL Final     HDL Cholesterol   Date Value Ref Range Status   08/13/2022 33 (L) 40 - 60 mg/dL Final     LDL Cholesterol    Date Value Ref Range Status   08/13/2022 90 0 - 100 mg/dL Final     LDL Chol Calc (NIH)   Date Value Ref Range Status   08/02/2021 142 (H) 0 - 100 mg/dL Final     LDL/HDL Ratio   Date Value Ref Range Status   08/13/2022 2.58  Final     Hemoglobin A1C   Date Value Ref Range Status   08/12/2022 5.70 (H) 4.80 - 5.60 % Final         Assessment / Plan     Assessment/Plan:  1. Constipation, unspecified constipation type  Soap suds enema now  Magnesium citrate one bottle now.  Miralax 17 g daily    2. Hypertensive emergency with possible hypertensive encephalopathy  Continue current medication   Monitor blood pressure  Goal less than 130/80    3. Gait disturbance  PT eval and treat    4.  Bilateral deafness      5. Glaucoma of left eye, unspecified glaucoma type          Return in about 4 weeks (around 9/21/2022). unless patient needs to be seen sooner or acute issues arise.      I have discussed the patient results/orders and and plan/recommendation with them at today's visit.      Anabelle Rasmussen, DO   08/24/2022

## 2022-10-28 NOTE — PROGRESS NOTES
X-ray is negative for any acute abnormalities of the bones, does note some osteopenia.  She may need an MRI given the continuation of the severe pain.  However I would really like for her to be reevaluated by an orthopedic surgeon, consult was placed at this visit.  We can discuss this further at her appointment coming up in 2 weeks please keep this.

## 2022-10-28 NOTE — TELEPHONE ENCOUNTER
Caller: Digna Russell    Relationship: Self    Best call back number: [phone]    What is the best time to reach you: ANYTIME    Who are you requesting to speak with (clinical staff, provider,  specific staff member):CLINICAL - GENEVA      What was the call regarding: PATIENT IS HAVING A LOT PAIN IN HER LEG AND IS ASKING FOR A REFERRAL FOR AN ULTRASOUND TO BE SENT.    Do you require a callback: YES

## 2022-10-28 NOTE — TELEPHONE ENCOUNTER
Let pt know she would have to be seen before any imaging could be called in.  Advised if pain had increased that much she needed to go to the hospital to be seen.  She states she will just wait until her apt

## 2022-11-11 PROBLEM — R73.03 PREDIABETES: Status: ACTIVE | Noted: 2022-01-01

## 2022-11-11 PROBLEM — E78.2 MIXED HYPERLIPIDEMIA: Status: ACTIVE | Noted: 2022-01-01
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
## 2022-11-14 NOTE — PROGRESS NOTES
White blood cell count is elevated, urine is noted to have 2+ bacteria, if she can come back in here to leave a urine culture that would be great.  We will treat according to results from that.  We will repeat a BNP and a CBC at her appointment in December, she needs to drink more water at least 64 ounces daily, if there is no improvement in her kidney function next month we will have to likely refer to nephrology.

## 2022-11-18 NOTE — PROGRESS NOTES
Urine culture is normal, continue to monitor for signs of infection and please let me know if you note any.  Please keep appointment on 12/09.
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
## 2022-12-09 NOTE — PROGRESS NOTES
I have tried calling her x2, no answer, we have already recommended drinking Pedialyte 8 ounces 2 times daily to help with electrolyte depletion, needs to increase water intake as well, at least 64 oz. Daily. Concerning signs of continued elevation in white blood cell count as well as her platelets, when considering source of infection she has had diarrhea for the last 2 weeks, she denied any abdominal pain and there was no signs of acute infection on examination in the office yesterday.  However, I am most certainly need to know if diarrhea does not resolve or if new symptoms occur such as vomiting, fever, chills or abdominal pain, will likely need some imaging to further assess, has had some elevations in her alkaline phosphatase, may consider a right upper quadrant ultrasound versus CT scan of the abdomen and pelvis.  Please let me know if you get a hold of her and let me know her symptoms are as of now.

## 2023-01-01 ENCOUNTER — OFFICE VISIT (OUTPATIENT)
Dept: INTERNAL MEDICINE | Facility: CLINIC | Age: 68
End: 2023-01-01
Payer: MEDICARE

## 2023-01-01 ENCOUNTER — TELEPHONE (OUTPATIENT)
Dept: INTERNAL MEDICINE | Facility: CLINIC | Age: 68
End: 2023-01-01

## 2023-01-01 VITALS
HEIGHT: 70 IN | DIASTOLIC BLOOD PRESSURE: 68 MMHG | OXYGEN SATURATION: 97 % | TEMPERATURE: 96.9 F | HEART RATE: 86 BPM | WEIGHT: 143 LBS | SYSTOLIC BLOOD PRESSURE: 100 MMHG | BODY MASS INDEX: 20.47 KG/M2

## 2023-01-01 DIAGNOSIS — G89.29 CHRONIC PAIN OF RIGHT LOWER EXTREMITY: ICD-10-CM

## 2023-01-01 DIAGNOSIS — R10.31 RLQ ABDOMINAL PAIN: ICD-10-CM

## 2023-01-01 DIAGNOSIS — M79.604 CHRONIC PAIN OF RIGHT LOWER EXTREMITY: ICD-10-CM

## 2023-01-01 DIAGNOSIS — R10.32 LLQ PAIN: Primary | ICD-10-CM

## 2023-01-01 DIAGNOSIS — E86.0 DEHYDRATION: ICD-10-CM

## 2023-01-01 DIAGNOSIS — R34 DECREASED URINE OUTPUT: ICD-10-CM

## 2023-01-01 DIAGNOSIS — N28.9 IMPAIRED RENAL FUNCTION: ICD-10-CM

## 2023-01-01 PROCEDURE — 99213 OFFICE O/P EST LOW 20 MIN: CPT

## 2023-01-05 NOTE — PROGRESS NOTES
Subjective   Digna Russell is a 67 y.o. female.   Chief Complaint   Patient presents with   • Diarrhea     Not better  Having to wear pull-ups  Not eating much   • Extremity Weakness     Thinks she needs to go to a nursing home.  Had a hard time getting from lobby to exam room  Says she can't take care of herself. Her sister usually helps her but she broke her femur can't help her now.         History of Present Illness   Digna Raines is seen here today for a 4-week reevaluation of chronic pain of right lower extremity as well as continued diarrhea.  She presents here today with her brother, she reports that her sister recently fractured her femur and is unable to take care of her.  She presents here today in a wheelchair, reports that she has been too weak to ambulate on her own.  She repeatedly tells me that she needs to go to a nursing home.  Her brother reports that the medication she has been taking for her stomach has not been helping, when clarified as to what medication this was she reports the duloxetine that was prescribed at her last visit for the chronic pain that she continues to experience in the upper part of her right lower extremity, work-up for this has been insignificant for any acute pathologies.  She reports that she has abstained from using a heating pad directly to the skin as she had been previously.  She states that anytime she eats or drinks anything she immediately experiences diarrhea.  She reports several times over and over again that every time she drinks some sun drop with Tylenol that it just runs right through her.  She denies any nausea vomiting or diarrhea.  She denies noting any abdominal pain.  When I asked her the last time that she voided she cannot tell me.  Her blood pressure today is 100/68 which is lower than usual.  Her brother tells me that a few days ago she went to [facility] asking to be admitted to a skilled nursing facility and complaining of her chronic right  lower extremity pain, he states that they discharged her with some pain medication and gave her a cortisone shot.  I do not currently have any records to review concerning this.  She and he both deny utilizing any over-the-counter medications to help with the diarrhea.  He states that he has noted she does not eat because she is worried of having fecal incontinence, has been wearing pull-ups related to this.  She denies any fevers, chills.  On examination of her on palpation of left lower quadrant she reports severe pain to palpation, reports mild discomfort to right lower quadrant palpation.  She reports that she does not feel that she is unable to void for urine specimen.  Of note this past year she did leave the skilled nursing facility that she was at [facility] given reports of poor care, has since been living with her sister.    The following portions of the patient's history were reviewed and updated as appropriate: allergies, current medications, past family history, past medical history, past social history, past surgical history and problem list.    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    • Glaucoma, left eye    • IBS (irritable bowel syndrome)    • Pain in right hip    • Wears hearing aid in right ear       Past Surgical History:   Procedure Laterality Date   • HYSTERECTOMY     • STEROID INJECTION Right 4/21/2022    Procedure: FLUOROSCOPIC GUIDED RIGHT HIP AND TROCHANTERIC BURSA INJECTION;  Surgeon: Ronaldo Calles MD;  Location: [facility];  Service: Orthopedics;  Laterality: Right;   • TONSILLECTOMY AND ADENOIDECTOMY     • TUBAL ABDOMINAL LIGATION Bilateral         Current Outpatient Medications:   •  acetaminophen (TYLENOL) 325 MG tablet, Take 650 mg by mouth Every 4 (Four) Hours As Needed for Mild Pain  or Moderate Pain ., Disp: , Rfl:   •  atorvastatin (LIPITOR) 80 MG tablet, Take 1 tablet by mouth Every Night., Disp: 90  tablet, Rfl:   •  brimonidine (ALPHAGAN) 0.2 % ophthalmic solution, INSTILL 1 DROP INTO LEFT EYE TWICE DAILY, Disp: , Rfl:   •  dorzolamide-timolol (COSOPT) 22.3-6.8 MG/ML ophthalmic solution, Administer 1 drop to both eyes 2 (Two) Times a Day., Disp: , Rfl:   •  DULoxetine (CYMBALTA) 20 MG capsule, Take 1 capsule by mouth Daily., Disp: 31 capsule, Rfl: 0  •  Lumigan 0.01 % ophthalmic drops, Administer 1 drop to both eyes Every Night., Disp: , Rfl:   •  QUEtiapine (SEROquel) 25 MG tablet, Take 1 tablet by mouth Every Night., Disp: , Rfl:   •  rOPINIRole (REQUIP) 1 MG tablet, Take 1 tablet by mouth Every Night. Take 1 hour before bedtime., Disp: , Rfl:   •  amLODIPine (NORVASC) 10 MG tablet, Take 1 tablet by mouth Daily., Disp: , Rfl:   •  aspirin 81 MG chewable tablet, Chew 1 tablet Daily., Disp: , Rfl:       /68 (BP Location: Left arm, Patient Position: Sitting, Cuff Size: Adult)   Pulse 86   Temp 96.9 °F (36.1 °C) (Temporal)   Ht 177.8 cm (70\")   Wt 64.9 kg (143 lb)   LMP  (LMP Unknown)   SpO2 97%   BMI 20.52 kg/m²      Body mass index is 20.52 kg/m².  BMI is within normal parameters. No other follow-up for BMI required.       Physical Exam  Vitals and nursing note reviewed.   Constitutional:       General: She is not in acute distress.     Appearance: She is ill-appearing. She is not toxic-appearing or diaphoretic.   HENT:      Head: Normocephalic and atraumatic.      Mouth/Throat:      Comments: Mucous membranes noted to be dry  Cardiovascular:      Rate and Rhythm: Normal rate and regular rhythm.      Pulses: Normal pulses.      Heart sounds: Normal heart sounds.   Pulmonary:      Effort: Pulmonary effort is normal.      Breath sounds: Normal breath sounds.   Abdominal:      General: There is no distension.      Palpations: There is no mass.      Tenderness: There is abdominal tenderness (Tenderness reported along entire lower abdomen, most severe in the left lower quadrant). There is right CVA  tenderness and guarding (Left lower quadrant). There is no rebound.      Hernia: No hernia is present.   Musculoskeletal:         General: Tenderness (Right lower extremity baseline) present.      Cervical back: Normal range of motion and neck supple.   Skin:     General: Skin is warm and dry.      Capillary Refill: Capillary refill takes less than 2 seconds.      Coloration: Skin is pale.   Neurological:      Mental Status: She is alert. She is disoriented.   Psychiatric:         Thought Content: Thought content normal.         Judgment: Judgment normal.               Assessment & Plan   Diagnoses and all orders for this visit:    1. LLQ pain (Primary)    2. RLQ abdominal pain    3. Dehydration    4. Impaired renal function    5. Decreased urine output    6. Chronic pain of right lower extremity               Plan of care reviewed with Ms. Russell and her brother  Concern for potential diverticulitis versus cystitis given location of pain on examination, previous examination of renal function noted renal impairment and dehydration, this was 4 weeks ago, I am greatly concerned at the potential level of dehydration she is at right now given lower blood pressure and inability to void.  I have recommended going to the ER for more complete work-up, including CT of abdomen and pelvis, labs and IV hydration.  Brother reports that it would be most convenient for the family if she went to [facility] ER, I have advised that I will call ahead and report findings.  I have recommended requesting admission if at all possible so that she can meet the criteria for skilled nursing facility placement.  ER at [facility] as requested a fax of current medications, we will ensure to send this over.  In case she has not admitted I will want her to return next week for further evaluation.

## 2023-01-12 ENCOUNTER — TELEPHONE (OUTPATIENT)
Dept: INTERNAL MEDICINE CLINIC | Age: 68
End: 2023-01-12

## 2023-01-12 NOTE — TELEPHONE ENCOUNTER
Hospice pt at [facility] in [facility]   Dx liver, uterine , retroperitoneal cancer     Pt is on Roxanol 0.25 ml every 3 hrs and not controlling pain  - can they increase to 0.5 ml every 3 hrs ?      Please advise   No known allergies

## 2023-01-17 ENCOUNTER — TELEPHONE (OUTPATIENT)
Dept: INTERNAL MEDICINE CLINIC | Age: 68
End: 2023-01-17

## 2023-01-17 NOTE — TELEPHONE ENCOUNTER
Hospice pt at [facility]   Dx Liver cancer,  retroperitoneum cancer , hx uterine cancer      Pt has 4+ edema to Lower ext and is not on diuretic - should they add anything  ? Pt is also on Oxycodone 5 mg every 4 hrs and they are asking to increase that to 10 mg every 4 hrs  - is that ok ?       No known allergies     Please advise

## 2023-01-19 ENCOUNTER — TELEPHONE (OUTPATIENT)
Dept: INTERNAL MEDICINE CLINIC | Age: 68
End: 2023-01-19

## 2023-01-19 NOTE — TELEPHONE ENCOUNTER
Hospice pt at [facility]  DX Liver cancer,  retroperitoneum cancer , hx uterine cancer      Pt is on Oxcodone 10 mg every 4 hrs PRN and NH is not giving that to her as often as she needs it - nurse went in yesterday and she was in a lot of pain and they had not given her pain med yet      Nurse is asking to schedule the oxycodone 10 every 4 hrs - is this ok  ?      No known allergies

## 2023-01-20 ENCOUNTER — TELEPHONE (OUTPATIENT)
Dept: INTERNAL MEDICINE CLINIC | Age: 68
End: 2023-01-20

## 2023-01-24 ENCOUNTER — TELEPHONE (OUTPATIENT)
Dept: INTERNAL MEDICINE CLINIC | Age: 68
End: 2023-01-24

## 2023-01-24 NOTE — TELEPHONE ENCOUNTER
Caller: Sydnie Chamberlain    Relationship: Emergency Contact    Best call back number: [phone]    What is the best time to reach you: ANYTIME    Who are you requesting to speak with (clinical staff, provider,  specific staff member): CLINICAL      What was the call regarding: SYDNIE (ESTELA'S SISTER IS GETTING GUARDIANSHIP OF PATIENT) CALLED TO REPORTS THAT ESTELA IS IN [facility] IN [place] UNDER HOSPICE CARE. WITH STAGE 4 CANCER. SYDNIE WANTED TO LET YOU ALL KNOW THIS.    Do you require a callback: YES

## 2023-02-21 ENCOUNTER — TELEPHONE (OUTPATIENT)
Dept: INTERNAL MEDICINE CLINIC | Age: 68
End: 2023-02-21

## 2023-02-23 ENCOUNTER — TELEPHONE (OUTPATIENT)
Dept: INTERNAL MEDICINE | Facility: CLINIC | Age: 68
End: 2023-02-23

## 2023-02-23 NOTE — TELEPHONE ENCOUNTER
"  “Please be informed that patient has passed. Patient has been marked  in the system. The date of death is: 2023\".    Caller: Iveth Chamberlain    Relationship: Emergency Contact    Best call back number: [phone]    "

## (undated) DEVICE — SYR LL TP 10ML STRL

## (undated) DEVICE — GLV SURG BIOGEL LTX PF 7 1/2

## (undated) DEVICE — BNDG ADHS CURAD FLX/FABRC 1X3IN STRL LF

## (undated) DEVICE — NEEDLE, QUINCKE, 18GX3.5": Brand: MEDLINE

## (undated) DEVICE — APPL CHLORAPREP HI/LITE TINTED 3ML ORNG

## (undated) DEVICE — NDL HYPO PRECISIONGLIDE REG 22G 1 1/2

## (undated) DEVICE — PK TURNOVER RM ADV